# Patient Record
Sex: FEMALE | Race: WHITE
[De-identification: names, ages, dates, MRNs, and addresses within clinical notes are randomized per-mention and may not be internally consistent; named-entity substitution may affect disease eponyms.]

---

## 2022-10-30 ENCOUNTER — HOSPITAL ENCOUNTER (EMERGENCY)
Dept: HOSPITAL 26 - MED | Age: 57
Discharge: HOME | End: 2022-10-30
Payer: COMMERCIAL

## 2022-10-30 VITALS — HEIGHT: 62 IN | BODY MASS INDEX: 25.76 KG/M2 | WEIGHT: 140 LBS

## 2022-10-30 VITALS — SYSTOLIC BLOOD PRESSURE: 145 MMHG | DIASTOLIC BLOOD PRESSURE: 75 MMHG

## 2022-10-30 VITALS — SYSTOLIC BLOOD PRESSURE: 169 MMHG | DIASTOLIC BLOOD PRESSURE: 90 MMHG

## 2022-10-30 DIAGNOSIS — F17.200: ICD-10-CM

## 2022-10-30 DIAGNOSIS — J45.901: Primary | ICD-10-CM

## 2022-10-30 PROCEDURE — 99283 EMERGENCY DEPT VISIT LOW MDM: CPT

## 2022-10-30 PROCEDURE — 94760 N-INVAS EAR/PLS OXIMETRY 1: CPT

## 2022-10-30 PROCEDURE — 94640 AIRWAY INHALATION TREATMENT: CPT

## 2022-10-30 NOTE — NUR
Pt brought to bed 12 with SOB and active anxiety attack. RT called to bedside 
and pt placed on monitor at this time. Pt AOX4, GCS15, speaking in full 
sentences. Hx of asthma.

## 2022-10-30 NOTE — NUR
Patient discharged with v/s stable. Written and verbal after care instructions 
given and explained with teachback. 

Patient alert, oriented and verbalized understanding of instructions. 
Ambulatory with steady gait. All questions addressed prior to discharge. ID 
band removed. Patient advised to follow up with PMD. Rx of prednisone/albuterol 
given. Patient educated on indication of medication including possible reaction 
and side effects. Opportunity to ask questions provided and answered.

## 2022-10-30 NOTE — NUR
55 y/o female bib  for anxiety attack and asthma exacerbation. RT called 
to bedside and assessed. Pt able to soeak in full sentences and remains AOX4, 
able to make needs known and GCS15. Pt endorses severe anxiety and has not been 
taking her asthma medications 



PmHx: asthma/Anxiety

## 2023-02-04 ENCOUNTER — HOSPITAL ENCOUNTER (EMERGENCY)
Dept: HOSPITAL 26 - MED | Age: 58
Discharge: HOME | End: 2023-02-04
Payer: COMMERCIAL

## 2023-02-04 VITALS — DIASTOLIC BLOOD PRESSURE: 57 MMHG | SYSTOLIC BLOOD PRESSURE: 130 MMHG

## 2023-02-04 VITALS — BODY MASS INDEX: 28 KG/M2 | HEIGHT: 64 IN | WEIGHT: 164 LBS

## 2023-02-04 VITALS — DIASTOLIC BLOOD PRESSURE: 87 MMHG | SYSTOLIC BLOOD PRESSURE: 136 MMHG

## 2023-02-04 DIAGNOSIS — J45.901: Primary | ICD-10-CM

## 2023-02-04 DIAGNOSIS — Z88.8: ICD-10-CM

## 2023-02-04 DIAGNOSIS — J44.9: ICD-10-CM

## 2023-02-04 PROCEDURE — 94640 AIRWAY INHALATION TREATMENT: CPT

## 2023-02-04 PROCEDURE — 99283 EMERGENCY DEPT VISIT LOW MDM: CPT

## 2023-02-04 PROCEDURE — 71045 X-RAY EXAM CHEST 1 VIEW: CPT

## 2023-02-04 NOTE — NUR
dPatient discharged with v/s stable. Written and verbal after care instructions 
given and explained. 

Patient verbalized understanding. Ambulatory with steady gait. All questions 
addressed prior to discharge. Advised to follow up with PMD.

## 2023-03-09 ENCOUNTER — HOSPITAL ENCOUNTER (INPATIENT)
Dept: HOSPITAL 26 - MED | Age: 58
LOS: 16 days | DRG: 720 | End: 2023-03-25
Attending: STUDENT IN AN ORGANIZED HEALTH CARE EDUCATION/TRAINING PROGRAM | Admitting: STUDENT IN AN ORGANIZED HEALTH CARE EDUCATION/TRAINING PROGRAM
Payer: COMMERCIAL

## 2023-03-09 VITALS — SYSTOLIC BLOOD PRESSURE: 110 MMHG | DIASTOLIC BLOOD PRESSURE: 74 MMHG

## 2023-03-09 VITALS — SYSTOLIC BLOOD PRESSURE: 148 MMHG | DIASTOLIC BLOOD PRESSURE: 65 MMHG

## 2023-03-09 VITALS — DIASTOLIC BLOOD PRESSURE: 54 MMHG | SYSTOLIC BLOOD PRESSURE: 97 MMHG

## 2023-03-09 VITALS — DIASTOLIC BLOOD PRESSURE: 81 MMHG | SYSTOLIC BLOOD PRESSURE: 118 MMHG

## 2023-03-09 VITALS
WEIGHT: 165 LBS | BODY MASS INDEX: 31.15 KG/M2 | DIASTOLIC BLOOD PRESSURE: 76 MMHG | SYSTOLIC BLOOD PRESSURE: 127 MMHG | HEIGHT: 61 IN

## 2023-03-09 VITALS — SYSTOLIC BLOOD PRESSURE: 115 MMHG | DIASTOLIC BLOOD PRESSURE: 67 MMHG

## 2023-03-09 VITALS — SYSTOLIC BLOOD PRESSURE: 158 MMHG | DIASTOLIC BLOOD PRESSURE: 50 MMHG

## 2023-03-09 VITALS — DIASTOLIC BLOOD PRESSURE: 74 MMHG | SYSTOLIC BLOOD PRESSURE: 109 MMHG

## 2023-03-09 VITALS — SYSTOLIC BLOOD PRESSURE: 148 MMHG | DIASTOLIC BLOOD PRESSURE: 83 MMHG

## 2023-03-09 VITALS — DIASTOLIC BLOOD PRESSURE: 49 MMHG | SYSTOLIC BLOOD PRESSURE: 91 MMHG

## 2023-03-09 VITALS — SYSTOLIC BLOOD PRESSURE: 69 MMHG | DIASTOLIC BLOOD PRESSURE: 45 MMHG

## 2023-03-09 DIAGNOSIS — E87.1: ICD-10-CM

## 2023-03-09 DIAGNOSIS — E83.42: ICD-10-CM

## 2023-03-09 DIAGNOSIS — E83.39: ICD-10-CM

## 2023-03-09 DIAGNOSIS — N17.0: ICD-10-CM

## 2023-03-09 DIAGNOSIS — Z88.8: ICD-10-CM

## 2023-03-09 DIAGNOSIS — J96.21: ICD-10-CM

## 2023-03-09 DIAGNOSIS — J69.0: ICD-10-CM

## 2023-03-09 DIAGNOSIS — E87.20: ICD-10-CM

## 2023-03-09 DIAGNOSIS — Z79.899: ICD-10-CM

## 2023-03-09 DIAGNOSIS — I46.9: ICD-10-CM

## 2023-03-09 DIAGNOSIS — J44.0: ICD-10-CM

## 2023-03-09 DIAGNOSIS — R57.9: ICD-10-CM

## 2023-03-09 DIAGNOSIS — Z20.822: ICD-10-CM

## 2023-03-09 DIAGNOSIS — K63.89: ICD-10-CM

## 2023-03-09 DIAGNOSIS — Z91.199: ICD-10-CM

## 2023-03-09 DIAGNOSIS — F17.200: ICD-10-CM

## 2023-03-09 DIAGNOSIS — J45.902: ICD-10-CM

## 2023-03-09 DIAGNOSIS — A41.9: Primary | ICD-10-CM

## 2023-03-09 DIAGNOSIS — J44.1: ICD-10-CM

## 2023-03-09 DIAGNOSIS — R65.21: ICD-10-CM

## 2023-03-09 DIAGNOSIS — D72.829: ICD-10-CM

## 2023-03-09 LAB
ALBUMIN FLD-MCNC: 3.8 G/DL (ref 3.4–5)
ANION GAP SERPL CALCULATED.3IONS-SCNC: 10.5 MMOL/L (ref 8–16)
AST SERPL-CCNC: 79 U/L (ref 15–37)
BASOPHILS # BLD AUTO: 0 K/UL (ref 0–0.22)
BASOPHILS NFR BLD AUTO: 0.1 % (ref 0–2)
BILIRUB SERPL-MCNC: 1 MG/DL (ref 0–1)
BUN SERPL-MCNC: 35 MG/DL (ref 7–18)
CHLORIDE SERPL-SCNC: 96 MMOL/L (ref 98–107)
CO2 SERPL-SCNC: 32.9 MMOL/L (ref 21–32)
CREAT SERPL-MCNC: 1 MG/DL (ref 0.6–1.3)
EOSINOPHIL # BLD AUTO: 0 K/UL (ref 0–0.4)
EOSINOPHIL NFR BLD AUTO: 0 % (ref 0–4)
ERYTHROCYTE [DISTWIDTH] IN BLOOD BY AUTOMATED COUNT: 14.7 % (ref 11.6–13.7)
GFR SERPL CREATININE-BSD FRML MDRD: 73 ML/MIN (ref 90–?)
GLUCOSE SERPL-MCNC: 174 MG/DL (ref 74–106)
HCT VFR BLD AUTO: 43.4 % (ref 36–48)
HGB BLD-MCNC: 14.7 G/DL (ref 12–16)
LYMPHOCYTES # BLD AUTO: 0.3 K/UL (ref 2.5–16.5)
LYMPHOCYTES NFR BLD AUTO: 2.8 % (ref 20.5–51.1)
MCH RBC QN AUTO: 34 PG (ref 27–31)
MCHC RBC AUTO-ENTMCNC: 34 G/DL (ref 33–37)
MCV RBC AUTO: 99.3 FL (ref 80–94)
MONOCYTES # BLD AUTO: 0.9 K/UL (ref 0.8–1)
MONOCYTES NFR BLD AUTO: 7.7 % (ref 1.7–9.3)
NEUTROPHILS # BLD AUTO: 10.4 K/UL (ref 1.8–7.7)
NEUTROPHILS NFR BLD AUTO: 89.4 % (ref 42.2–75.2)
PLATELET # BLD AUTO: 205 K/UL (ref 140–450)
POTASSIUM SERPL-SCNC: 4.4 MMOL/L (ref 3.5–5.1)
RBC # BLD AUTO: 4.37 MIL/UL (ref 4.2–5.4)
SODIUM SERPL-SCNC: 135 MMOL/L (ref 136–145)
WBC # BLD AUTO: 11.7 K/UL (ref 4.8–10.8)

## 2023-03-09 PROCEDURE — 5A1955Z RESPIRATORY VENTILATION, GREATER THAN 96 CONSECUTIVE HOURS: ICD-10-PCS | Performed by: EMERGENCY MEDICINE

## 2023-03-09 PROCEDURE — 0BH17EZ INSERTION OF ENDOTRACHEAL AIRWAY INTO TRACHEA, VIA NATURAL OR ARTIFICIAL OPENING: ICD-10-PCS | Performed by: EMERGENCY MEDICINE

## 2023-03-09 PROCEDURE — 02HV33Z INSERTION OF INFUSION DEVICE INTO SUPERIOR VENA CAVA, PERCUTANEOUS APPROACH: ICD-10-PCS | Performed by: EMERGENCY MEDICINE

## 2023-03-09 RX ADMIN — WATER SCH MG: 1 INJECTION INTRAMUSCULAR; INTRAVENOUS; SUBCUTANEOUS at 20:24

## 2023-03-09 RX ADMIN — PROPOFOL PRN MLS/HR: 10 INJECTION, EMULSION INTRAVENOUS at 20:35

## 2023-03-09 RX ADMIN — ALBUTEROL SULFATE SCH MG: 2.5 SOLUTION RESPIRATORY (INHALATION) at 08:14

## 2023-03-09 RX ADMIN — ACETAMINOPHEN PRN MG: 325 TABLET ORAL at 08:39

## 2023-03-09 RX ADMIN — ALBUTEROL SULFATE SCH MG: 2.5 SOLUTION RESPIRATORY (INHALATION) at 15:00

## 2023-03-09 RX ADMIN — WATER SCH MG: 1 INJECTION INTRAMUSCULAR; INTRAVENOUS; SUBCUTANEOUS at 13:06

## 2023-03-09 RX ADMIN — ALBUTEROL SULFATE SCH MG: 2.5 SOLUTION RESPIRATORY (INHALATION) at 19:38

## 2023-03-09 RX ADMIN — ALBUTEROL SULFATE SCH MG: 2.5 SOLUTION RESPIRATORY (INHALATION) at 14:05

## 2023-03-09 RX ADMIN — ALBUTEROL SULFATE SCH MG: 2.5 SOLUTION RESPIRATORY (INHALATION) at 23:07

## 2023-03-09 NOTE — NUR
DR. TODD LLANES NOTIFIED OF STAT ABG RESULTS VORBO: BIPAP TO MASK; ADVISED 
FOREMENTIONED MD OF PATIENT CONFUSION AND REFUSAL TO WEAR BIPAP TO MASK [facility] CHARGE 
NURSE NOTIFIED

## 2023-03-09 NOTE — NUR
DR COOLEY AT BEDSIDE, PATIENT WAS SUCCESSFULLY INTUBATED AT 2037 WITH ETT SIZE 8.0 AND 
SECURED WITH BITTING BLOCK ANCHOR-FAST 24cm @GUM. BILATERAL BREATH SOUNDS ON AUSCULTATION. 
COLOR CHANGE IN COLORIMETRIC CO2. STAT X RAY ORDERED FOR OFFICIAL ETT PLACEMENT.

## 2023-03-09 NOTE — NUR
RECEIVED PT ON BED ON BIPAP, DROWSY TO LETHARGIC, WITH OOACIONAL EPISODES OF AGITATION AND 
RESTLESSNESS. ASSESSMENT DONE. SAFETY AND FALL PRECAUTION MAINTAINED. REPOSITIONED 
COMFORTABLY, SUPPORTED WITH PILLOWS. KEPT CLEAN AND DRY. SR. CONT TO RICHAR,

## 2023-03-09 NOTE — NUR
ABG DONE BY THE RT AND RESULT WAS SHOWN TO ER MD DR. RIBERA, DECIDED TO INTUBATE THE PATIENT; 
DR. LLANES WAS ALSO INFORMED OF ABG RESULT, ORDERED TO INTUBATE THE PATIENT.

## 2023-03-09 NOTE — NUR
PATIENT BECAME EXTREMELY ANXIOUS  SHORT OF BREATH WITH SOME DIFFICULTY BREATHING. MEDICATED 
WITH ATIVAN, RT AT BEDSIDE  ATTEMPTED TO PLACE BIPAP ON PATIENT BUT SHE REFUSED AND BECAME 
COMBATIVE. CHARGE NURSE  UPDATED MD ON PATIENT STATUS, ORDERS RECEIVED, PATIENT WILL BE 
TRANSFERRED TO ICU . PATIENT CURRENTLY PLACED ON HI FLOW AT 40LITERS, FIO2 50% TEMP 33. WILL 
CONTINUE TO MONITOR CLOSELY.

## 2023-03-09 NOTE — NUR
PATIENT HAS BEEN SCREENED AND CATEGORIZED AS MODERATE NUTRITION RISK. PATIENT WILL BE SEEN 
WITHIN 3-5 DAYS OF ADMISSION.



3/12/233/14/23



REVIEWED BY RINA BRADY RD

## 2023-03-09 NOTE — NUR
PATIENT REFUSING BIPAP TO MASK AT THIS TIME [facility] CHARGE RN NOTIFIED

## 2023-03-09 NOTE — NUR
PT STILL AGITATED, REMOVED BiPAP MASK OFF. PLACED BiPAP MASK BACK ON PT. PT TOLERATING WELL 
AT THIS TIME. RN AT BEDSIDE

## 2023-03-09 NOTE — NUR
Patient will be admitted to care of MD HANDY. Admited to TELE.  Will go to 
room 119A. Belongings list completed.  Report to WIL ACEVEDO .

## 2023-03-09 NOTE — NUR
REPORTED POST INTUBATION ABG RESULTS TO DR. LLANES. NO CHANGES AT THIS TIME. TITRATED FiO2. 
OBTAINED AN ORDER FOR AM ABG.

## 2023-03-09 NOTE — NUR
58 Y/O F presents with SOB and chest pressure. pt denies any pain at the 
moment. pt stated "i cant breathe" pt has a history of smoking. pt is A&Ox4, 
skin intact. pt stated she was seen at [facility] for the same thing 
xyesterday. 



PMH-

allergies-azithromycin

## 2023-03-09 NOTE — NUR
AT THE BEDSIDE AND ASSESSED THE PATIENT. PER DR. COOLEY NO NEED FOR PATIENT TO BE 
INTUBATED AND WILL ORDER BREATHING TREATMENT. WILL CONTINUE TO MONITOR.

## 2023-03-09 NOTE — NUR
RECEIVED REPORT FROM AM SHIFT. PATIENT WAS SEEN AND ASSESSED. PATIENT IS ON SPONTANEOUS TIME 
MODE ON BiPAP. PATIENT IS ON BiPAP SETTINGS: IPAP 18, EPAP 7, BACK UP RR 16, FiO2 40%. SPO2 
97%. NOTICED ADEQUATE BILATERAL CHEST RISE AND FALL. MACHINE PLUGGED IN RED OUTLET. BiPAP 
ALARMS SET APPROPRIATELY AND AUDIBLE TO ENVIRONMENT. AMBU BAG AT BEDSIDE. PATIENT IS IN NO 
RESPIRATORY DISTRESS AT THIS TIME. BILATERAL BREATH SOUNDS ON AUSCULTATION; 

UPPER LOBES: WHEEZING 

LOWER LOBES: WHEEZING

HHN TX GIVEN AS ORDERED AND PATIENT TOLERATED TX WELL WITH NO ADVERSE REACTION.

WILL CONTINUE TO MONITOR PATIENT.

## 2023-03-09 NOTE — NUR
PT NOW INTUBATED, CXR DONE FOR PLACEMENT VERIFICATION. CENTRAL LINE INSERTED BY SAME MD TO 
LEFT SUBCLAVIAN 3 LUMEN. STARTED ON FENTANYL AND VECURONIUM DRIPS PER PROTOCOL.  CASS SOFT 
WRIST RESTRAINTS IN PLACE. POST ABG DONE, RESULT REPORTED TO MD, FIO2 CHANGED TO 40%. CHARGE 
NURSE CALLED AND UPDATED PT'S SPOUSE ADILSON. SAFETY MEASURES OBSERVED. CONT TO MONITOR.

## 2023-03-09 NOTE — NUR
CRITICAL ABG RESULTS WERE REPORTED TO DR. LLANES AND UPDATED ON PT CONDITION. INTUBATION 
ORDER OBTAINED. DR. COOLEY (ED DOCTOR) WAS NOTIFIED AND AGREED TO INTUBATE PT.

## 2023-03-09 NOTE — NUR
PLACED ON A VAPOTHERM AT THIS TIME; SEDATION GIVEN BY RN DUE COMBATIVENESS; EXTERNAL AND 
INTERNAL SUCTION FOR COPIOUS FROTH YELLOW SECRETIONS

## 2023-03-10 VITALS — DIASTOLIC BLOOD PRESSURE: 68 MMHG | SYSTOLIC BLOOD PRESSURE: 107 MMHG

## 2023-03-10 VITALS — DIASTOLIC BLOOD PRESSURE: 53 MMHG | SYSTOLIC BLOOD PRESSURE: 90 MMHG

## 2023-03-10 VITALS — SYSTOLIC BLOOD PRESSURE: 111 MMHG | DIASTOLIC BLOOD PRESSURE: 73 MMHG

## 2023-03-10 VITALS — SYSTOLIC BLOOD PRESSURE: 101 MMHG | DIASTOLIC BLOOD PRESSURE: 68 MMHG

## 2023-03-10 VITALS — DIASTOLIC BLOOD PRESSURE: 65 MMHG | SYSTOLIC BLOOD PRESSURE: 105 MMHG

## 2023-03-10 VITALS — DIASTOLIC BLOOD PRESSURE: 66 MMHG | SYSTOLIC BLOOD PRESSURE: 102 MMHG

## 2023-03-10 VITALS — SYSTOLIC BLOOD PRESSURE: 99 MMHG | DIASTOLIC BLOOD PRESSURE: 63 MMHG

## 2023-03-10 VITALS — DIASTOLIC BLOOD PRESSURE: 75 MMHG | SYSTOLIC BLOOD PRESSURE: 116 MMHG

## 2023-03-10 VITALS — DIASTOLIC BLOOD PRESSURE: 61 MMHG | SYSTOLIC BLOOD PRESSURE: 104 MMHG

## 2023-03-10 VITALS — SYSTOLIC BLOOD PRESSURE: 116 MMHG | DIASTOLIC BLOOD PRESSURE: 77 MMHG

## 2023-03-10 VITALS — SYSTOLIC BLOOD PRESSURE: 124 MMHG | DIASTOLIC BLOOD PRESSURE: 71 MMHG

## 2023-03-10 VITALS — DIASTOLIC BLOOD PRESSURE: 67 MMHG | SYSTOLIC BLOOD PRESSURE: 97 MMHG

## 2023-03-10 VITALS — SYSTOLIC BLOOD PRESSURE: 98 MMHG | DIASTOLIC BLOOD PRESSURE: 66 MMHG

## 2023-03-10 VITALS — SYSTOLIC BLOOD PRESSURE: 108 MMHG | DIASTOLIC BLOOD PRESSURE: 56 MMHG

## 2023-03-10 VITALS — DIASTOLIC BLOOD PRESSURE: 68 MMHG | SYSTOLIC BLOOD PRESSURE: 103 MMHG

## 2023-03-10 VITALS — DIASTOLIC BLOOD PRESSURE: 83 MMHG | SYSTOLIC BLOOD PRESSURE: 176 MMHG

## 2023-03-10 VITALS — DIASTOLIC BLOOD PRESSURE: 60 MMHG | SYSTOLIC BLOOD PRESSURE: 105 MMHG

## 2023-03-10 VITALS — SYSTOLIC BLOOD PRESSURE: 107 MMHG | DIASTOLIC BLOOD PRESSURE: 60 MMHG

## 2023-03-10 VITALS — DIASTOLIC BLOOD PRESSURE: 69 MMHG | SYSTOLIC BLOOD PRESSURE: 102 MMHG

## 2023-03-10 VITALS — DIASTOLIC BLOOD PRESSURE: 67 MMHG | SYSTOLIC BLOOD PRESSURE: 96 MMHG

## 2023-03-10 VITALS — DIASTOLIC BLOOD PRESSURE: 58 MMHG | SYSTOLIC BLOOD PRESSURE: 98 MMHG

## 2023-03-10 VITALS — DIASTOLIC BLOOD PRESSURE: 83 MMHG | SYSTOLIC BLOOD PRESSURE: 131 MMHG

## 2023-03-10 VITALS — DIASTOLIC BLOOD PRESSURE: 70 MMHG | SYSTOLIC BLOOD PRESSURE: 114 MMHG

## 2023-03-10 VITALS — DIASTOLIC BLOOD PRESSURE: 71 MMHG | SYSTOLIC BLOOD PRESSURE: 103 MMHG

## 2023-03-10 VITALS — DIASTOLIC BLOOD PRESSURE: 73 MMHG | SYSTOLIC BLOOD PRESSURE: 109 MMHG

## 2023-03-10 VITALS — SYSTOLIC BLOOD PRESSURE: 95 MMHG | DIASTOLIC BLOOD PRESSURE: 61 MMHG

## 2023-03-10 LAB
ANION GAP SERPL CALCULATED.3IONS-SCNC: 4.8 MMOL/L (ref 8–16)
BASOPHILS # BLD AUTO: 0 K/UL (ref 0–0.22)
BASOPHILS NFR BLD AUTO: 0.5 % (ref 0–2)
BUN SERPL-MCNC: 31 MG/DL (ref 7–18)
CHLORIDE SERPL-SCNC: 101 MMOL/L (ref 98–107)
CO2 SERPL-SCNC: 38.4 MMOL/L (ref 21–32)
CREAT SERPL-MCNC: 0.7 MG/DL (ref 0.6–1.3)
EOSINOPHIL # BLD AUTO: 0 K/UL (ref 0–0.4)
EOSINOPHIL NFR BLD AUTO: 0 % (ref 0–4)
ERYTHROCYTE [DISTWIDTH] IN BLOOD BY AUTOMATED COUNT: 14.3 % (ref 11.6–13.7)
GFR SERPL CREATININE-BSD FRML MDRD: 111 ML/MIN (ref 90–?)
GLUCOSE SERPL-MCNC: 184 MG/DL (ref 74–106)
HCT VFR BLD AUTO: 37.9 % (ref 36–48)
HGB BLD-MCNC: 13.1 G/DL (ref 12–16)
LYMPHOCYTES # BLD AUTO: 0.2 K/UL (ref 2.5–16.5)
LYMPHOCYTES NFR BLD AUTO: 2.3 % (ref 20.5–51.1)
MCH RBC QN AUTO: 34 PG (ref 27–31)
MCHC RBC AUTO-ENTMCNC: 35 G/DL (ref 33–37)
MCV RBC AUTO: 97.3 FL (ref 80–94)
MONOCYTES # BLD AUTO: 0.4 K/UL (ref 0.8–1)
MONOCYTES NFR BLD AUTO: 5.6 % (ref 1.7–9.3)
NEUTROPHILS # BLD AUTO: 6.7 K/UL (ref 1.8–7.7)
NEUTROPHILS NFR BLD AUTO: 91.6 % (ref 42.2–75.2)
PLATELET # BLD AUTO: 169 K/UL (ref 140–450)
POTASSIUM SERPL-SCNC: 4.2 MMOL/L (ref 3.5–5.1)
RBC # BLD AUTO: 3.9 MIL/UL (ref 4.2–5.4)
SODIUM SERPL-SCNC: 140 MMOL/L (ref 136–145)
WBC # BLD AUTO: 7.3 K/UL (ref 4.8–10.8)

## 2023-03-10 RX ADMIN — INSULIN HUMAN PRN MLS/HR: 100 INJECTION, SOLUTION PARENTERAL at 23:49

## 2023-03-10 RX ADMIN — PROPOFOL PRN MLS/HR: 10 INJECTION, EMULSION INTRAVENOUS at 21:36

## 2023-03-10 RX ADMIN — PROPOFOL PRN MLS/HR: 10 INJECTION, EMULSION INTRAVENOUS at 12:34

## 2023-03-10 RX ADMIN — PROPOFOL PRN MLS/HR: 10 INJECTION, EMULSION INTRAVENOUS at 08:35

## 2023-03-10 RX ADMIN — ALBUTEROL SULFATE SCH MG: 2.5 SOLUTION RESPIRATORY (INHALATION) at 10:37

## 2023-03-10 RX ADMIN — WATER SCH MG: 1 INJECTION INTRAMUSCULAR; INTRAVENOUS; SUBCUTANEOUS at 17:04

## 2023-03-10 RX ADMIN — WATER SCH MG: 1 INJECTION INTRAMUSCULAR; INTRAVENOUS; SUBCUTANEOUS at 23:55

## 2023-03-10 RX ADMIN — IPRATROPIUM BROMIDE SCH MG: 0.5 SOLUTION RESPIRATORY (INHALATION) at 10:37

## 2023-03-10 RX ADMIN — PROPOFOL PRN MLS/HR: 10 INJECTION, EMULSION INTRAVENOUS at 03:02

## 2023-03-10 RX ADMIN — ALBUTEROL SULFATE SCH MG: 2.5 SOLUTION RESPIRATORY (INHALATION) at 22:45

## 2023-03-10 RX ADMIN — IPRATROPIUM BROMIDE SCH MG: 0.5 SOLUTION RESPIRATORY (INHALATION) at 19:28

## 2023-03-10 RX ADMIN — ALBUTEROL SULFATE SCH MG: 2.5 SOLUTION RESPIRATORY (INHALATION) at 15:36

## 2023-03-10 RX ADMIN — ALBUTEROL SULFATE SCH MG: 2.5 SOLUTION RESPIRATORY (INHALATION) at 08:05

## 2023-03-10 RX ADMIN — IPRATROPIUM BROMIDE SCH MG: 0.5 SOLUTION RESPIRATORY (INHALATION) at 08:05

## 2023-03-10 RX ADMIN — WATER SCH MG: 1 INJECTION INTRAMUSCULAR; INTRAVENOUS; SUBCUTANEOUS at 05:45

## 2023-03-10 RX ADMIN — WATER SCH MG: 1 INJECTION INTRAMUSCULAR; INTRAVENOUS; SUBCUTANEOUS at 12:05

## 2023-03-10 RX ADMIN — IPRATROPIUM BROMIDE SCH MG: 0.5 SOLUTION RESPIRATORY (INHALATION) at 15:36

## 2023-03-10 RX ADMIN — ALBUTEROL SULFATE SCH MG: 2.5 SOLUTION RESPIRATORY (INHALATION) at 19:28

## 2023-03-10 RX ADMIN — IPRATROPIUM BROMIDE SCH MG: 0.5 SOLUTION RESPIRATORY (INHALATION) at 22:45

## 2023-03-10 RX ADMIN — ALBUTEROL SULFATE SCH MG: 2.5 SOLUTION RESPIRATORY (INHALATION) at 03:52

## 2023-03-10 RX ADMIN — PROPOFOL PRN MLS/HR: 10 INJECTION, EMULSION INTRAVENOUS at 17:11

## 2023-03-10 NOTE — NUR
ASSUMED CARE OF THIS PATIENT AND ASSESSMENT DONE AND COMPLETED.SEDATED ON PROPOFOL 50MCG AND 
FENTANYL 1MCG.ASYMPTOMATIC OF ANY PAIN AND DISCOMFORT AT THIS TIME.SR/ST ON THE 
MONITOR.AFEBRILE.ON VENTILATOR WITH SETTINGS ACPC  20 45% PEEP 5.LUNG SOUNDS WITH RHONCHI 
THROUGHOUT.ABDOMEN OBESE WITH +BS X4 QUADS.ALL PULSES PRESENT AND PALPLABLE LEFT SUBCLAVIAN 
PATENT AND INTACT AND BENIGN.JAIN IN PLACE AND DRAINING ADEQUATE AMOUNT OF URINE.WILL 
CONTINUE [place] PROCEED WITH CARE.

## 2023-03-10 NOTE — NUR
DC PLANNING 



*** ASSESSMENT COMPLETE***



PLEASE REFER TO ASSESSMENT FOR ADDITIONAL DETAILS



PT IS A 57 YR OLD FEMALE ADMITTED TO [facility] FROM HOME WITH DX OF ASTHMA 

EXACERBATION. PT HAS PAST MEDICAL HX OF ASTHMA, COPD, AND SMOKING.



PT IS REPORTED TO UTILIZE WC AS NEEDED, HOWEVER REPORTS PT IS TYPICALLY 

AMBULATORY. ADILSON REPORTS PT RECENTLY BEGAN REQUIRING ASSISTANCE WITH ADL'S 

THAT  AND SON AID WITH.



PT IS REPORTED TO MEET WITH PSYCHIATRIST 1X MONTHLY FOR MANAGEMENT OF ANXIETY SX'S.



ADILSON REPORTS TENTATIVE DC PLAN IS FOR PT TO RETURN HOME WITH FAMILY 

PROVIDING TRANSPORTATION, WHEN MEDICALLY STABLE.


-------------------------------------------------------------------------------

Addendum: 03/10/23 at 1618 by Miranda LEACH

-------------------------------------------------------------------------------

Amended: Links added.

## 2023-03-10 NOTE — NUR
REPORT RECEIVED. PATIENT WAS AGITATED AS EVIDENCED BY , 137 AND EYES WIDE OPEN AND 
TREMBLING. BUT I TITRATED THE PROPOFOL UP AND THE FENTANYL AND THE PATIENT BECAME CALM AND 
BP WENT DOWN /80 AND CLOSED EYES COMFORTABLY. WILL HOLD VEC DRIP UNIL DR DE LA ROSA COMES 
BY TO ASSESS. I INFORMED HIM VIA TEXT THAT THE PATIENT MIGHT NOT NEED PARALYTICS ANYMORE. 
WILL RESTART PROTOCOL IF HE FINDS IN NECESSARY

## 2023-03-10 NOTE — NUR
PER DR. LLANES, PT RATE ON VENT WAS TITRATED DOWN TO 20. PATIENT IS NO LONGER BREATH STACK 
AND IN SYNCH WITH VENTILATOR

## 2023-03-11 VITALS — DIASTOLIC BLOOD PRESSURE: 74 MMHG | SYSTOLIC BLOOD PRESSURE: 120 MMHG

## 2023-03-11 VITALS — SYSTOLIC BLOOD PRESSURE: 155 MMHG | DIASTOLIC BLOOD PRESSURE: 77 MMHG

## 2023-03-11 VITALS — SYSTOLIC BLOOD PRESSURE: 118 MMHG | DIASTOLIC BLOOD PRESSURE: 75 MMHG

## 2023-03-11 VITALS — DIASTOLIC BLOOD PRESSURE: 93 MMHG | SYSTOLIC BLOOD PRESSURE: 160 MMHG

## 2023-03-11 VITALS — SYSTOLIC BLOOD PRESSURE: 131 MMHG | DIASTOLIC BLOOD PRESSURE: 79 MMHG

## 2023-03-11 VITALS — DIASTOLIC BLOOD PRESSURE: 70 MMHG | SYSTOLIC BLOOD PRESSURE: 105 MMHG

## 2023-03-11 VITALS — SYSTOLIC BLOOD PRESSURE: 114 MMHG | DIASTOLIC BLOOD PRESSURE: 68 MMHG

## 2023-03-11 VITALS — SYSTOLIC BLOOD PRESSURE: 116 MMHG | DIASTOLIC BLOOD PRESSURE: 73 MMHG

## 2023-03-11 VITALS — DIASTOLIC BLOOD PRESSURE: 73 MMHG | SYSTOLIC BLOOD PRESSURE: 110 MMHG

## 2023-03-11 VITALS — SYSTOLIC BLOOD PRESSURE: 107 MMHG | DIASTOLIC BLOOD PRESSURE: 62 MMHG

## 2023-03-11 VITALS — SYSTOLIC BLOOD PRESSURE: 100 MMHG | DIASTOLIC BLOOD PRESSURE: 64 MMHG

## 2023-03-11 VITALS — SYSTOLIC BLOOD PRESSURE: 146 MMHG | DIASTOLIC BLOOD PRESSURE: 98 MMHG

## 2023-03-11 VITALS — DIASTOLIC BLOOD PRESSURE: 78 MMHG | SYSTOLIC BLOOD PRESSURE: 128 MMHG

## 2023-03-11 VITALS — DIASTOLIC BLOOD PRESSURE: 1 MMHG | SYSTOLIC BLOOD PRESSURE: 114 MMHG

## 2023-03-11 VITALS — SYSTOLIC BLOOD PRESSURE: 141 MMHG | DIASTOLIC BLOOD PRESSURE: 84 MMHG

## 2023-03-11 VITALS — SYSTOLIC BLOOD PRESSURE: 92 MMHG | DIASTOLIC BLOOD PRESSURE: 63 MMHG

## 2023-03-11 VITALS — SYSTOLIC BLOOD PRESSURE: 99 MMHG | DIASTOLIC BLOOD PRESSURE: 64 MMHG

## 2023-03-11 VITALS — DIASTOLIC BLOOD PRESSURE: 67 MMHG | SYSTOLIC BLOOD PRESSURE: 114 MMHG

## 2023-03-11 VITALS — SYSTOLIC BLOOD PRESSURE: 101 MMHG | DIASTOLIC BLOOD PRESSURE: 63 MMHG

## 2023-03-11 VITALS — SYSTOLIC BLOOD PRESSURE: 124 MMHG | DIASTOLIC BLOOD PRESSURE: 78 MMHG

## 2023-03-11 VITALS — SYSTOLIC BLOOD PRESSURE: 115 MMHG | DIASTOLIC BLOOD PRESSURE: 74 MMHG

## 2023-03-11 VITALS — DIASTOLIC BLOOD PRESSURE: 77 MMHG | SYSTOLIC BLOOD PRESSURE: 122 MMHG

## 2023-03-11 VITALS — SYSTOLIC BLOOD PRESSURE: 92 MMHG | DIASTOLIC BLOOD PRESSURE: 64 MMHG

## 2023-03-11 VITALS — DIASTOLIC BLOOD PRESSURE: 70 MMHG | SYSTOLIC BLOOD PRESSURE: 107 MMHG

## 2023-03-11 VITALS — DIASTOLIC BLOOD PRESSURE: 69 MMHG | SYSTOLIC BLOOD PRESSURE: 102 MMHG

## 2023-03-11 VITALS — SYSTOLIC BLOOD PRESSURE: 118 MMHG | DIASTOLIC BLOOD PRESSURE: 97 MMHG

## 2023-03-11 LAB
ANION GAP SERPL CALCULATED.3IONS-SCNC: 8.5 MMOL/L (ref 8–16)
BUN SERPL-MCNC: 28 MG/DL (ref 7–18)
CHLORIDE SERPL-SCNC: 102 MMOL/L (ref 98–107)
CO2 SERPL-SCNC: 36 MMOL/L (ref 21–32)
CREAT SERPL-MCNC: 0.7 MG/DL (ref 0.6–1.3)
ERYTHROCYTE [DISTWIDTH] IN BLOOD BY AUTOMATED COUNT: 14.9 % (ref 11.6–13.7)
GFR SERPL CREATININE-BSD FRML MDRD: 111 ML/MIN (ref 90–?)
GLUCOSE SERPL-MCNC: 191 MG/DL (ref 74–106)
HCT VFR BLD AUTO: 38.9 % (ref 36–48)
HGB BLD-MCNC: 13 G/DL (ref 12–16)
LYMPHOCYTES NFR BLD MANUAL: 3 % (ref 20–46)
MCH RBC QN AUTO: 34 PG (ref 27–31)
MCHC RBC AUTO-ENTMCNC: 34 G/DL (ref 33–37)
MCV RBC AUTO: 100.4 FL (ref 80–94)
MONOCYTES NFR BLD MANUAL: 9 % (ref 5–12)
PLATELET # BLD AUTO: 210 K/UL (ref 140–450)
POTASSIUM SERPL-SCNC: 4.5 MMOL/L (ref 3.5–5.1)
RBC # BLD AUTO: 3.88 MIL/UL (ref 4.2–5.4)
SODIUM SERPL-SCNC: 142 MMOL/L (ref 136–145)
WBC # BLD AUTO: 9.8 K/UL (ref 4.8–10.8)

## 2023-03-11 RX ADMIN — IPRATROPIUM BROMIDE SCH MG: 0.5 SOLUTION RESPIRATORY (INHALATION) at 23:02

## 2023-03-11 RX ADMIN — PROPOFOL PRN MLS/HR: 10 INJECTION, EMULSION INTRAVENOUS at 14:10

## 2023-03-11 RX ADMIN — IPRATROPIUM BROMIDE SCH MG: 0.5 SOLUTION RESPIRATORY (INHALATION) at 07:54

## 2023-03-11 RX ADMIN — PROPOFOL PRN MLS/HR: 10 INJECTION, EMULSION INTRAVENOUS at 10:35

## 2023-03-11 RX ADMIN — ALBUTEROL SULFATE SCH MG: 2.5 SOLUTION RESPIRATORY (INHALATION) at 10:15

## 2023-03-11 RX ADMIN — INSULIN HUMAN PRN MLS/HR: 100 INJECTION, SOLUTION PARENTERAL at 14:56

## 2023-03-11 RX ADMIN — ALBUTEROL SULFATE SCH MG: 2.5 SOLUTION RESPIRATORY (INHALATION) at 20:09

## 2023-03-11 RX ADMIN — IPRATROPIUM BROMIDE SCH MG: 0.5 SOLUTION RESPIRATORY (INHALATION) at 20:09

## 2023-03-11 RX ADMIN — PROPOFOL PRN MLS/HR: 10 INJECTION, EMULSION INTRAVENOUS at 01:19

## 2023-03-11 RX ADMIN — ALBUTEROL SULFATE SCH MG: 2.5 SOLUTION RESPIRATORY (INHALATION) at 03:07

## 2023-03-11 RX ADMIN — WATER SCH MG: 1 INJECTION INTRAMUSCULAR; INTRAVENOUS; SUBCUTANEOUS at 17:24

## 2023-03-11 RX ADMIN — IPRATROPIUM BROMIDE SCH MG: 0.5 SOLUTION RESPIRATORY (INHALATION) at 03:07

## 2023-03-11 RX ADMIN — PROPOFOL PRN MLS/HR: 10 INJECTION, EMULSION INTRAVENOUS at 06:01

## 2023-03-11 RX ADMIN — ALBUTEROL SULFATE SCH MG: 2.5 SOLUTION RESPIRATORY (INHALATION) at 15:38

## 2023-03-11 RX ADMIN — ALBUTEROL SULFATE SCH MG: 2.5 SOLUTION RESPIRATORY (INHALATION) at 23:02

## 2023-03-11 RX ADMIN — IPRATROPIUM BROMIDE SCH MG: 0.5 SOLUTION RESPIRATORY (INHALATION) at 10:14

## 2023-03-11 RX ADMIN — WATER SCH MG: 1 INJECTION INTRAMUSCULAR; INTRAVENOUS; SUBCUTANEOUS at 11:45

## 2023-03-11 RX ADMIN — PROPOFOL PRN MLS/HR: 10 INJECTION, EMULSION INTRAVENOUS at 18:15

## 2023-03-11 RX ADMIN — WATER SCH MG: 1 INJECTION INTRAMUSCULAR; INTRAVENOUS; SUBCUTANEOUS at 05:15

## 2023-03-11 RX ADMIN — IPRATROPIUM BROMIDE SCH MG: 0.5 SOLUTION RESPIRATORY (INHALATION) at 15:38

## 2023-03-11 RX ADMIN — ENOXAPARIN SODIUM SCH MG: 40 INJECTION SUBCUTANEOUS at 08:13

## 2023-03-11 RX ADMIN — PROPOFOL PRN MLS/HR: 10 INJECTION, EMULSION INTRAVENOUS at 22:15

## 2023-03-11 RX ADMIN — ALBUTEROL SULFATE SCH MG: 2.5 SOLUTION RESPIRATORY (INHALATION) at 07:54

## 2023-03-11 NOTE — NUR
SCREEN FOR LOW OSMAR SCALE AT RISK, CONTINUE TO FOLLOW PRESSURE ULCER PREVENTION 
INTERVENTIONS.

-TURN AND REPOSITION PATIENT Q 2H, ASSIST IF NEEDED 

-ASSESS AND MONITOR SKIN CONDITION DURING POSITION CHANGES

-OFFLOAD BILATERAL HEELS BY PLACING PILLOWS UNDER CALVES AT ALL TIMES, UNLESS OTHERWISE 
CONTRAINDICATED

-PRESSURE REDISTRIBUTION BY PLACING PILLOWS AND OFFLOADING SACRALCOCCYX

-KEEP SKIN CLEAN AND DRY AT ALL TIMES.

## 2023-03-11 NOTE — NUR
Received pt sedated. ETT to vent settings AC PC FiO2@45%, rate 20 PEEP 5. Sinus tachy on the 
monitor. OG-tube intact and clamped. Randle catheter intact and draining to gravity. Central 
line on left subclavian intact and infusing Propofol@50mcg/kg/min, Fentanyl@1mcg/kg/hr, and 
levophed@2mcg/min. Peripheral IV on right forearm 22 gauge intact and saline flushed. Bilat 
soft wrist restraints in place with no signs of injury. Safety precautions in place.

## 2023-03-12 VITALS — SYSTOLIC BLOOD PRESSURE: 148 MMHG | DIASTOLIC BLOOD PRESSURE: 87 MMHG

## 2023-03-12 VITALS — SYSTOLIC BLOOD PRESSURE: 101 MMHG | DIASTOLIC BLOOD PRESSURE: 65 MMHG

## 2023-03-12 VITALS — DIASTOLIC BLOOD PRESSURE: 93 MMHG | SYSTOLIC BLOOD PRESSURE: 139 MMHG

## 2023-03-12 VITALS — SYSTOLIC BLOOD PRESSURE: 98 MMHG | DIASTOLIC BLOOD PRESSURE: 69 MMHG

## 2023-03-12 VITALS — DIASTOLIC BLOOD PRESSURE: 77 MMHG | SYSTOLIC BLOOD PRESSURE: 139 MMHG

## 2023-03-12 VITALS — SYSTOLIC BLOOD PRESSURE: 132 MMHG | DIASTOLIC BLOOD PRESSURE: 83 MMHG

## 2023-03-12 VITALS — SYSTOLIC BLOOD PRESSURE: 143 MMHG | DIASTOLIC BLOOD PRESSURE: 88 MMHG

## 2023-03-12 VITALS — SYSTOLIC BLOOD PRESSURE: 141 MMHG | DIASTOLIC BLOOD PRESSURE: 78 MMHG

## 2023-03-12 VITALS — SYSTOLIC BLOOD PRESSURE: 96 MMHG | DIASTOLIC BLOOD PRESSURE: 69 MMHG

## 2023-03-12 VITALS — DIASTOLIC BLOOD PRESSURE: 69 MMHG | SYSTOLIC BLOOD PRESSURE: 101 MMHG

## 2023-03-12 VITALS — DIASTOLIC BLOOD PRESSURE: 66 MMHG | SYSTOLIC BLOOD PRESSURE: 95 MMHG

## 2023-03-12 VITALS — SYSTOLIC BLOOD PRESSURE: 152 MMHG | DIASTOLIC BLOOD PRESSURE: 91 MMHG

## 2023-03-12 VITALS — DIASTOLIC BLOOD PRESSURE: 91 MMHG | SYSTOLIC BLOOD PRESSURE: 138 MMHG

## 2023-03-12 VITALS — DIASTOLIC BLOOD PRESSURE: 69 MMHG | SYSTOLIC BLOOD PRESSURE: 100 MMHG

## 2023-03-12 VITALS — DIASTOLIC BLOOD PRESSURE: 68 MMHG | SYSTOLIC BLOOD PRESSURE: 96 MMHG

## 2023-03-12 VITALS — SYSTOLIC BLOOD PRESSURE: 102 MMHG | DIASTOLIC BLOOD PRESSURE: 68 MMHG

## 2023-03-12 VITALS — DIASTOLIC BLOOD PRESSURE: 66 MMHG | SYSTOLIC BLOOD PRESSURE: 98 MMHG

## 2023-03-12 VITALS — SYSTOLIC BLOOD PRESSURE: 139 MMHG | DIASTOLIC BLOOD PRESSURE: 87 MMHG

## 2023-03-12 VITALS — SYSTOLIC BLOOD PRESSURE: 101 MMHG | DIASTOLIC BLOOD PRESSURE: 67 MMHG

## 2023-03-12 LAB
ANION GAP SERPL CALCULATED.3IONS-SCNC: 7.5 MMOL/L (ref 8–16)
BASOPHILS NFR BLD MANUAL: 0 % (ref 0–2)
BUN SERPL-MCNC: 26 MG/DL (ref 7–18)
CHLORIDE SERPL-SCNC: 104 MMOL/L (ref 98–107)
CO2 SERPL-SCNC: 38.1 MMOL/L (ref 21–32)
CREAT SERPL-MCNC: 0.7 MG/DL (ref 0.6–1.3)
EOSINOPHIL NFR BLD MANUAL: 0 % (ref 0–4)
ERYTHROCYTE [DISTWIDTH] IN BLOOD BY AUTOMATED COUNT: 14.6 % (ref 11.6–13.7)
GFR SERPL CREATININE-BSD FRML MDRD: 111 ML/MIN (ref 90–?)
GLUCOSE SERPL-MCNC: 185 MG/DL (ref 74–106)
HCT VFR BLD AUTO: 39.2 % (ref 36–48)
HGB BLD-MCNC: 13.3 G/DL (ref 12–16)
LYMPHOCYTES NFR BLD MANUAL: 6 % (ref 20–46)
MCH RBC QN AUTO: 34 PG (ref 27–31)
MCHC RBC AUTO-ENTMCNC: 34 G/DL (ref 33–37)
MCV RBC AUTO: 100.5 FL (ref 80–94)
MONOCYTES NFR BLD MANUAL: 12 % (ref 5–12)
PLATELET # BLD AUTO: 173 K/UL (ref 140–450)
POTASSIUM SERPL-SCNC: 4.6 MMOL/L (ref 3.5–5.1)
RBC # BLD AUTO: 3.9 MIL/UL (ref 4.2–5.4)
SODIUM SERPL-SCNC: 145 MMOL/L (ref 136–145)
WBC # BLD AUTO: 7.3 K/UL (ref 4.8–10.8)

## 2023-03-12 RX ADMIN — PROPOFOL PRN MLS/HR: 10 INJECTION, EMULSION INTRAVENOUS at 03:12

## 2023-03-12 RX ADMIN — ALBUTEROL SULFATE SCH MG: 2.5 SOLUTION RESPIRATORY (INHALATION) at 14:27

## 2023-03-12 RX ADMIN — PROPOFOL PRN MLS/HR: 10 INJECTION, EMULSION INTRAVENOUS at 07:29

## 2023-03-12 RX ADMIN — NICARDIPINE HYDROCHLORIDE PRN MLS/HR: 25 INJECTION INTRAVENOUS at 09:56

## 2023-03-12 RX ADMIN — ALBUTEROL SULFATE SCH MG: 2.5 SOLUTION RESPIRATORY (INHALATION) at 11:02

## 2023-03-12 RX ADMIN — PROPOFOL PRN MLS/HR: 10 INJECTION, EMULSION INTRAVENOUS at 18:19

## 2023-03-12 RX ADMIN — WATER SCH MG: 1 INJECTION INTRAMUSCULAR; INTRAVENOUS; SUBCUTANEOUS at 11:43

## 2023-03-12 RX ADMIN — ALBUTEROL SULFATE SCH MG: 2.5 SOLUTION RESPIRATORY (INHALATION) at 03:54

## 2023-03-12 RX ADMIN — PROPOFOL PRN MLS/HR: 10 INJECTION, EMULSION INTRAVENOUS at 11:36

## 2023-03-12 RX ADMIN — ALBUTEROL SULFATE SCH MG: 2.5 SOLUTION RESPIRATORY (INHALATION) at 22:14

## 2023-03-12 RX ADMIN — INSULIN HUMAN PRN MLS/HR: 100 INJECTION, SOLUTION PARENTERAL at 00:37

## 2023-03-12 RX ADMIN — IPRATROPIUM BROMIDE SCH MG: 0.5 SOLUTION RESPIRATORY (INHALATION) at 11:02

## 2023-03-12 RX ADMIN — PROPOFOL PRN MLS/HR: 10 INJECTION, EMULSION INTRAVENOUS at 15:46

## 2023-03-12 RX ADMIN — IPRATROPIUM BROMIDE SCH MG: 0.5 SOLUTION RESPIRATORY (INHALATION) at 19:49

## 2023-03-12 RX ADMIN — ALBUTEROL SULFATE SCH MG: 2.5 SOLUTION RESPIRATORY (INHALATION) at 07:57

## 2023-03-12 RX ADMIN — WATER SCH MG: 1 INJECTION INTRAMUSCULAR; INTRAVENOUS; SUBCUTANEOUS at 17:55

## 2023-03-12 RX ADMIN — IPRATROPIUM BROMIDE SCH MG: 0.5 SOLUTION RESPIRATORY (INHALATION) at 03:53

## 2023-03-12 RX ADMIN — IPRATROPIUM BROMIDE SCH MG: 0.5 SOLUTION RESPIRATORY (INHALATION) at 22:14

## 2023-03-12 RX ADMIN — ENOXAPARIN SODIUM SCH MG: 40 INJECTION SUBCUTANEOUS at 08:39

## 2023-03-12 RX ADMIN — ALBUTEROL SULFATE SCH MG: 2.5 SOLUTION RESPIRATORY (INHALATION) at 19:49

## 2023-03-12 RX ADMIN — PROPOFOL PRN MLS/HR: 10 INJECTION, EMULSION INTRAVENOUS at 22:04

## 2023-03-12 RX ADMIN — IPRATROPIUM BROMIDE SCH MG: 0.5 SOLUTION RESPIRATORY (INHALATION) at 07:57

## 2023-03-12 RX ADMIN — WATER SCH MG: 1 INJECTION INTRAMUSCULAR; INTRAVENOUS; SUBCUTANEOUS at 05:40

## 2023-03-12 RX ADMIN — WATER SCH MG: 1 INJECTION INTRAMUSCULAR; INTRAVENOUS; SUBCUTANEOUS at 00:39

## 2023-03-12 RX ADMIN — IPRATROPIUM BROMIDE SCH MG: 0.5 SOLUTION RESPIRATORY (INHALATION) at 14:27

## 2023-03-12 NOTE — NUR
3/12/23 RD INITIAL ASSESSMENT COMPLETED.PLEASE REFER TO NUTRITION ASSESSMENT UNDER CARE 
ACTIVITY FOR ESTIMATED NUTRITIONAL NEEDS. 



1. CONTINUE VITAL AF 1.2 KATHARINA @ 20ML/HR,  ML Q4H.

    -IF PROPOFOL IS DISCONTINUED, RECOMMEND INCREASING VITAL AF 1.2 KATHARINA RATE TO 45 ML/HR TO 
MEET NUTRITIONAL NEEDS.

2. MONITOR GI SYMPTOMS

3. RD TO FOLLOW-UP 2-3 DAYS, HIGH RISK 



FATIMAH SHOEMAKER RD

## 2023-03-12 NOTE — NUR
PATIENT HAS BEEN RE-SCREENED AND CATEGORIZED AS HIGH NUTRITION RISK. PATIENT WILL BE SEEN 
WITHIN 1-2 DAYS OF REFERRAL RECEIVED.



3/11/23-3/13/23



FATIMAH SHOEMAKER RD

REFERRAL RECEIVED FOR TUBE FEEDING CONSULT.

## 2023-03-13 VITALS — DIASTOLIC BLOOD PRESSURE: 71 MMHG | SYSTOLIC BLOOD PRESSURE: 105 MMHG

## 2023-03-13 VITALS — SYSTOLIC BLOOD PRESSURE: 120 MMHG | DIASTOLIC BLOOD PRESSURE: 79 MMHG

## 2023-03-13 VITALS — SYSTOLIC BLOOD PRESSURE: 114 MMHG | DIASTOLIC BLOOD PRESSURE: 78 MMHG

## 2023-03-13 VITALS — DIASTOLIC BLOOD PRESSURE: 89 MMHG | SYSTOLIC BLOOD PRESSURE: 145 MMHG

## 2023-03-13 VITALS — SYSTOLIC BLOOD PRESSURE: 114 MMHG | DIASTOLIC BLOOD PRESSURE: 77 MMHG

## 2023-03-13 VITALS — DIASTOLIC BLOOD PRESSURE: 65 MMHG | SYSTOLIC BLOOD PRESSURE: 101 MMHG

## 2023-03-13 VITALS — DIASTOLIC BLOOD PRESSURE: 99 MMHG | SYSTOLIC BLOOD PRESSURE: 146 MMHG

## 2023-03-13 VITALS — SYSTOLIC BLOOD PRESSURE: 104 MMHG | DIASTOLIC BLOOD PRESSURE: 73 MMHG

## 2023-03-13 VITALS — SYSTOLIC BLOOD PRESSURE: 141 MMHG | DIASTOLIC BLOOD PRESSURE: 86 MMHG

## 2023-03-13 VITALS — DIASTOLIC BLOOD PRESSURE: 73 MMHG | SYSTOLIC BLOOD PRESSURE: 101 MMHG

## 2023-03-13 VITALS — DIASTOLIC BLOOD PRESSURE: 86 MMHG | SYSTOLIC BLOOD PRESSURE: 156 MMHG

## 2023-03-13 VITALS — DIASTOLIC BLOOD PRESSURE: 64 MMHG | SYSTOLIC BLOOD PRESSURE: 99 MMHG

## 2023-03-13 VITALS — SYSTOLIC BLOOD PRESSURE: 142 MMHG | DIASTOLIC BLOOD PRESSURE: 94 MMHG

## 2023-03-13 VITALS — DIASTOLIC BLOOD PRESSURE: 93 MMHG | SYSTOLIC BLOOD PRESSURE: 155 MMHG

## 2023-03-13 VITALS — SYSTOLIC BLOOD PRESSURE: 140 MMHG | DIASTOLIC BLOOD PRESSURE: 91 MMHG

## 2023-03-13 VITALS — SYSTOLIC BLOOD PRESSURE: 125 MMHG | DIASTOLIC BLOOD PRESSURE: 71 MMHG

## 2023-03-13 VITALS — SYSTOLIC BLOOD PRESSURE: 96 MMHG | DIASTOLIC BLOOD PRESSURE: 62 MMHG

## 2023-03-13 VITALS — SYSTOLIC BLOOD PRESSURE: 141 MMHG | DIASTOLIC BLOOD PRESSURE: 101 MMHG

## 2023-03-13 VITALS — DIASTOLIC BLOOD PRESSURE: 86 MMHG | SYSTOLIC BLOOD PRESSURE: 155 MMHG

## 2023-03-13 VITALS — DIASTOLIC BLOOD PRESSURE: 76 MMHG | SYSTOLIC BLOOD PRESSURE: 128 MMHG

## 2023-03-13 VITALS — SYSTOLIC BLOOD PRESSURE: 108 MMHG | DIASTOLIC BLOOD PRESSURE: 80 MMHG

## 2023-03-13 VITALS — SYSTOLIC BLOOD PRESSURE: 128 MMHG | DIASTOLIC BLOOD PRESSURE: 76 MMHG

## 2023-03-13 VITALS — SYSTOLIC BLOOD PRESSURE: 138 MMHG | DIASTOLIC BLOOD PRESSURE: 84 MMHG

## 2023-03-13 VITALS — SYSTOLIC BLOOD PRESSURE: 125 MMHG | DIASTOLIC BLOOD PRESSURE: 81 MMHG

## 2023-03-13 VITALS — DIASTOLIC BLOOD PRESSURE: 71 MMHG | SYSTOLIC BLOOD PRESSURE: 125 MMHG

## 2023-03-13 VITALS — SYSTOLIC BLOOD PRESSURE: 91 MMHG | DIASTOLIC BLOOD PRESSURE: 64 MMHG

## 2023-03-13 LAB
ANION GAP SERPL CALCULATED.3IONS-SCNC: 12.4 MMOL/L (ref 8–16)
BASOPHILS # BLD AUTO: 0 K/UL (ref 0–0.22)
BASOPHILS NFR BLD AUTO: 0.1 % (ref 0–2)
BUN SERPL-MCNC: 26 MG/DL (ref 7–18)
CHLORIDE SERPL-SCNC: 104 MMOL/L (ref 98–107)
CO2 SERPL-SCNC: 32.6 MMOL/L (ref 21–32)
CREAT SERPL-MCNC: 0.6 MG/DL (ref 0.6–1.3)
EOSINOPHIL # BLD AUTO: 0 K/UL (ref 0–0.4)
EOSINOPHIL NFR BLD AUTO: 0 % (ref 0–4)
ERYTHROCYTE [DISTWIDTH] IN BLOOD BY AUTOMATED COUNT: 14 % (ref 11.6–13.7)
GFR SERPL CREATININE-BSD FRML MDRD: 133 ML/MIN (ref 90–?)
GLUCOSE SERPL-MCNC: 194 MG/DL (ref 74–106)
HCT VFR BLD AUTO: 34.8 % (ref 36–48)
HGB BLD-MCNC: 11.9 G/DL (ref 12–16)
LYMPHOCYTES # BLD AUTO: 0.3 K/UL (ref 2.5–16.5)
LYMPHOCYTES NFR BLD AUTO: 3 % (ref 20.5–51.1)
MCH RBC QN AUTO: 34 PG (ref 27–31)
MCHC RBC AUTO-ENTMCNC: 34 G/DL (ref 33–37)
MCV RBC AUTO: 99.5 FL (ref 80–94)
MONOCYTES # BLD AUTO: 0.6 K/UL (ref 0.8–1)
MONOCYTES NFR BLD AUTO: 6.7 % (ref 1.7–9.3)
NEUTROPHILS # BLD AUTO: 7.7 K/UL (ref 1.8–7.7)
NEUTROPHILS NFR BLD AUTO: 90.2 % (ref 42.2–75.2)
PLATELET # BLD AUTO: 163 K/UL (ref 140–450)
POTASSIUM SERPL-SCNC: 4 MMOL/L (ref 3.5–5.1)
RBC # BLD AUTO: 3.5 MIL/UL (ref 4.2–5.4)
SODIUM SERPL-SCNC: 145 MMOL/L (ref 136–145)
WBC # BLD AUTO: 8.5 K/UL (ref 4.8–10.8)

## 2023-03-13 RX ADMIN — IPRATROPIUM BROMIDE SCH MG: 0.5 SOLUTION RESPIRATORY (INHALATION) at 16:27

## 2023-03-13 RX ADMIN — WATER SCH MG: 1 INJECTION INTRAMUSCULAR; INTRAVENOUS; SUBCUTANEOUS at 01:18

## 2023-03-13 RX ADMIN — ALBUTEROL SULFATE SCH MG: 2.5 SOLUTION RESPIRATORY (INHALATION) at 08:12

## 2023-03-13 RX ADMIN — ALBUTEROL SULFATE SCH MG: 2.5 SOLUTION RESPIRATORY (INHALATION) at 16:27

## 2023-03-13 RX ADMIN — ALBUTEROL SULFATE SCH MG: 2.5 SOLUTION RESPIRATORY (INHALATION) at 18:48

## 2023-03-13 RX ADMIN — NICARDIPINE HYDROCHLORIDE PRN MLS/HR: 25 INJECTION INTRAVENOUS at 09:00

## 2023-03-13 RX ADMIN — IPRATROPIUM BROMIDE SCH MG: 0.5 SOLUTION RESPIRATORY (INHALATION) at 23:15

## 2023-03-13 RX ADMIN — ALBUTEROL SULFATE SCH MG: 2.5 SOLUTION RESPIRATORY (INHALATION) at 01:10

## 2023-03-13 RX ADMIN — NICARDIPINE HYDROCHLORIDE PRN MLS/HR: 25 INJECTION INTRAVENOUS at 02:00

## 2023-03-13 RX ADMIN — WATER SCH MG: 1 INJECTION INTRAMUSCULAR; INTRAVENOUS; SUBCUTANEOUS at 20:32

## 2023-03-13 RX ADMIN — ALBUTEROL SULFATE SCH MG: 2.5 SOLUTION RESPIRATORY (INHALATION) at 23:15

## 2023-03-13 RX ADMIN — PROPOFOL PRN MLS/HR: 10 INJECTION, EMULSION INTRAVENOUS at 02:02

## 2023-03-13 RX ADMIN — ENOXAPARIN SODIUM SCH MG: 40 INJECTION SUBCUTANEOUS at 09:06

## 2023-03-13 RX ADMIN — ALBUTEROL SULFATE SCH MG: 2.5 SOLUTION RESPIRATORY (INHALATION) at 11:23

## 2023-03-13 RX ADMIN — WATER SCH MG: 1 INJECTION INTRAMUSCULAR; INTRAVENOUS; SUBCUTANEOUS at 05:14

## 2023-03-13 RX ADMIN — PROPOFOL PRN MLS/HR: 10 INJECTION, EMULSION INTRAVENOUS at 13:20

## 2023-03-13 RX ADMIN — IPRATROPIUM BROMIDE SCH MG: 0.5 SOLUTION RESPIRATORY (INHALATION) at 11:23

## 2023-03-13 RX ADMIN — WATER SCH MG: 1 INJECTION INTRAMUSCULAR; INTRAVENOUS; SUBCUTANEOUS at 12:33

## 2023-03-13 RX ADMIN — IPRATROPIUM BROMIDE SCH MG: 0.5 SOLUTION RESPIRATORY (INHALATION) at 18:48

## 2023-03-13 RX ADMIN — DEXTROSE PRN MLS/HR: 50 INJECTION, SOLUTION INTRAVENOUS at 18:25

## 2023-03-13 RX ADMIN — IPRATROPIUM BROMIDE SCH MG: 0.5 SOLUTION RESPIRATORY (INHALATION) at 08:12

## 2023-03-13 RX ADMIN — PROPOFOL PRN MLS/HR: 10 INJECTION, EMULSION INTRAVENOUS at 09:03

## 2023-03-13 RX ADMIN — PROPOFOL PRN MLS/HR: 10 INJECTION, EMULSION INTRAVENOUS at 05:14

## 2023-03-13 RX ADMIN — DEXTROSE PRN MLS/HR: 50 INJECTION, SOLUTION INTRAVENOUS at 11:43

## 2023-03-13 RX ADMIN — PROPOFOL PRN MLS/HR: 10 INJECTION, EMULSION INTRAVENOUS at 18:35

## 2023-03-13 RX ADMIN — IPRATROPIUM BROMIDE SCH MG: 0.5 SOLUTION RESPIRATORY (INHALATION) at 01:10

## 2023-03-13 NOTE — NUR
@1335 PT WAS PLACED ON CPAP 10/5 24%. PT IS TOLERATING CPAP WELL. NO RESP DISTRESS NOTED. HR 
104 SPO2 96%. RN AWARE OF CPAP STARTED. BACKUP VENT SETTINGS IN PLACE. ALARMS ARE SEYT AND 
HEARD.

## 2023-03-13 NOTE — NUR
ROUNDED ON PT. PT IS AWAKE WITH A VENT SETTINGS OF PC 30 RR 14 TINSP 0.9 24% +5. NO DISTRESS 
NOTED. VENT PLUGGED INTO RED OUTLET AND AMBU BAG AT BEDSIDE. TX GIVEN. COURSE BREATHE SOUNDS 
HEARD BILATERALLY WHEN SXNED MINIMAL SECRETION COLLECTED. WILL CONTINUE TO MONITOR.

## 2023-03-13 NOTE — NUR
ASSUMED CARE OF THIS PATIENT AND ASSESSMENT DONE AND COMPLETED.AFEBRILE.SEDATED ON PROPOFOL 
25,MCG,FENTANYL 50 MCG AND PRECEDEX 0.8 MCG.SR ON THE MONITOR HR 84..ON VENT SETTINGS AS 
ACPC  14 FIO2 24% PEEP 5 WITH O2 SAT AT 96%.LUNG SOUNDS WITH RHONCHI THROUGHOUT .ABDOMEN 
OBESE WITH = BS X4 QUADS.JAIN DRAINING ADEQUATE AMOUNT OF IRINE. ALL PULSES PRESENT AND 
PALPABLE.WILL PROCEED WITH CONTINUITY OF CARE.

## 2023-03-13 NOTE — NUR
RECEIVED BEDSIDE REPORT FROM NIGHT SHIFT RN, APRIL, FOR CONTINUITY OF CARE. PT AWAKE AND 
FOLLOWS COMMANDS. ETT TO VENT. SR ON MONITOR, TLC TO L SUBCLAVIAN INFUSING PROPOFOL AT 60 
MCG/KG/MIN, FENTANYL AT 2 MCG/KG/HR, NS TKO. OGT TO TUBE FEEDING 20ML/HR WITH FWF 100ML Q4H. 
BILATERAL SOFT WRIST RESTRAINTS IN PLACE FOR SAFETY, NO S/SX OF INJURY. STANDARD PRECAUTION. 
HOB 30 DEGREES, BED LOCKED AND IN LOWEST POSITION.

## 2023-03-13 NOTE — NUR
Pt. on bed sedated on Propofol and Fentanyl Drips. Diego. SWR in place to prevent extubation. 
SR to ST on the monitor. On vent via ETT with settings as follows AC PC Rate=14 Pressure=3 
PEEP (+)5 FiO2=24% with U4Lcd=76 to 99%. ABG done; Dr. Brown paged x 1 by RT with no 
response @ this time. Tolerated Vital @ 20 ml/hr with no residuals @ this time. Due meds. 
adm. Cardiac and resp. monitoring cont. Will cont. to monitoe.

## 2023-03-13 NOTE — NUR
SEEN AND EXAMINED BY DR. LLANES. ORDERS TO RESTART PRECEDEX AND WEAN PROPOFOL AND FENTANYL. 
ATTEMPT CPAP TRIALS LATER. DISCUSSED WITH RT AT BEDSIDE.

## 2023-03-13 NOTE — NUR
ROUNDED WITH MARIO ALBERTO ON THIS PT NEW ORDERS TO START CPAP TRIALS ON PT. MAMTAR ALSO DECREASED 
THE PINSP TO 20 FROM PINSP 30. PT TOELRATING CHANGES WELL RN AWARE. NO DISTRESS NOTED.

## 2023-03-14 VITALS — SYSTOLIC BLOOD PRESSURE: 100 MMHG | DIASTOLIC BLOOD PRESSURE: 70 MMHG

## 2023-03-14 VITALS — DIASTOLIC BLOOD PRESSURE: 75 MMHG | SYSTOLIC BLOOD PRESSURE: 115 MMHG

## 2023-03-14 VITALS — SYSTOLIC BLOOD PRESSURE: 141 MMHG | DIASTOLIC BLOOD PRESSURE: 93 MMHG

## 2023-03-14 VITALS — SYSTOLIC BLOOD PRESSURE: 124 MMHG | DIASTOLIC BLOOD PRESSURE: 92 MMHG

## 2023-03-14 VITALS — DIASTOLIC BLOOD PRESSURE: 86 MMHG | SYSTOLIC BLOOD PRESSURE: 134 MMHG

## 2023-03-14 VITALS — SYSTOLIC BLOOD PRESSURE: 131 MMHG | DIASTOLIC BLOOD PRESSURE: 82 MMHG

## 2023-03-14 VITALS — SYSTOLIC BLOOD PRESSURE: 99 MMHG | DIASTOLIC BLOOD PRESSURE: 70 MMHG

## 2023-03-14 VITALS — DIASTOLIC BLOOD PRESSURE: 85 MMHG | SYSTOLIC BLOOD PRESSURE: 127 MMHG

## 2023-03-14 VITALS — DIASTOLIC BLOOD PRESSURE: 72 MMHG | SYSTOLIC BLOOD PRESSURE: 105 MMHG

## 2023-03-14 VITALS — DIASTOLIC BLOOD PRESSURE: 75 MMHG | SYSTOLIC BLOOD PRESSURE: 122 MMHG

## 2023-03-14 VITALS — DIASTOLIC BLOOD PRESSURE: 65 MMHG | SYSTOLIC BLOOD PRESSURE: 106 MMHG

## 2023-03-14 VITALS — DIASTOLIC BLOOD PRESSURE: 82 MMHG | SYSTOLIC BLOOD PRESSURE: 134 MMHG

## 2023-03-14 VITALS — SYSTOLIC BLOOD PRESSURE: 109 MMHG | DIASTOLIC BLOOD PRESSURE: 69 MMHG

## 2023-03-14 VITALS — SYSTOLIC BLOOD PRESSURE: 90 MMHG | DIASTOLIC BLOOD PRESSURE: 64 MMHG

## 2023-03-14 VITALS — DIASTOLIC BLOOD PRESSURE: 77 MMHG | SYSTOLIC BLOOD PRESSURE: 116 MMHG

## 2023-03-14 VITALS — DIASTOLIC BLOOD PRESSURE: 88 MMHG | SYSTOLIC BLOOD PRESSURE: 140 MMHG

## 2023-03-14 VITALS — DIASTOLIC BLOOD PRESSURE: 70 MMHG | SYSTOLIC BLOOD PRESSURE: 111 MMHG

## 2023-03-14 VITALS — SYSTOLIC BLOOD PRESSURE: 123 MMHG | DIASTOLIC BLOOD PRESSURE: 62 MMHG

## 2023-03-14 VITALS — DIASTOLIC BLOOD PRESSURE: 92 MMHG | SYSTOLIC BLOOD PRESSURE: 144 MMHG

## 2023-03-14 VITALS — SYSTOLIC BLOOD PRESSURE: 129 MMHG | DIASTOLIC BLOOD PRESSURE: 79 MMHG

## 2023-03-14 LAB
ANION GAP SERPL CALCULATED.3IONS-SCNC: 11 MMOL/L (ref 8–16)
BASOPHILS # BLD AUTO: 0 K/UL (ref 0–0.22)
BASOPHILS NFR BLD AUTO: 0.2 % (ref 0–2)
BUN SERPL-MCNC: 26 MG/DL (ref 7–18)
CHLORIDE SERPL-SCNC: 100 MMOL/L (ref 98–107)
CO2 SERPL-SCNC: 32.5 MMOL/L (ref 21–32)
CREAT SERPL-MCNC: 0.6 MG/DL (ref 0.6–1.3)
EOSINOPHIL # BLD AUTO: 0 K/UL (ref 0–0.4)
EOSINOPHIL NFR BLD AUTO: 0 % (ref 0–4)
ERYTHROCYTE [DISTWIDTH] IN BLOOD BY AUTOMATED COUNT: 14.8 % (ref 11.6–13.7)
GFR SERPL CREATININE-BSD FRML MDRD: 133 ML/MIN (ref 90–?)
GLUCOSE SERPL-MCNC: 209 MG/DL (ref 74–106)
HCT VFR BLD AUTO: 38.5 % (ref 36–48)
HGB BLD-MCNC: 12.8 G/DL (ref 12–16)
LYMPHOCYTES # BLD AUTO: 0.4 K/UL (ref 2.5–16.5)
LYMPHOCYTES NFR BLD AUTO: 3.4 % (ref 20.5–51.1)
MCH RBC QN AUTO: 33 PG (ref 27–31)
MCHC RBC AUTO-ENTMCNC: 33 G/DL (ref 33–37)
MCV RBC AUTO: 100 FL (ref 80–94)
MONOCYTES # BLD AUTO: 0.7 K/UL (ref 0.8–1)
MONOCYTES NFR BLD AUTO: 6.1 % (ref 1.7–9.3)
NEUTROPHILS # BLD AUTO: 10.6 K/UL (ref 1.8–7.7)
NEUTROPHILS NFR BLD AUTO: 90.3 % (ref 42.2–75.2)
PLATELET # BLD AUTO: 152 K/UL (ref 140–450)
POTASSIUM SERPL-SCNC: 4.5 MMOL/L (ref 3.5–5.1)
RBC # BLD AUTO: 3.85 MIL/UL (ref 4.2–5.4)
SODIUM SERPL-SCNC: 139 MMOL/L (ref 136–145)
WBC # BLD AUTO: 11.7 K/UL (ref 4.8–10.8)

## 2023-03-14 RX ADMIN — ENOXAPARIN SODIUM SCH MG: 40 INJECTION SUBCUTANEOUS at 09:52

## 2023-03-14 RX ADMIN — ALBUTEROL SULFATE SCH MG: 2.5 SOLUTION RESPIRATORY (INHALATION) at 20:38

## 2023-03-14 RX ADMIN — IPRATROPIUM BROMIDE SCH MG: 0.5 SOLUTION RESPIRATORY (INHALATION) at 03:17

## 2023-03-14 RX ADMIN — IPRATROPIUM BROMIDE SCH MG: 0.5 SOLUTION RESPIRATORY (INHALATION) at 19:00

## 2023-03-14 RX ADMIN — WATER SCH MG: 1 INJECTION INTRAMUSCULAR; INTRAVENOUS; SUBCUTANEOUS at 21:17

## 2023-03-14 RX ADMIN — IPRATROPIUM BROMIDE SCH MG: 0.5 SOLUTION RESPIRATORY (INHALATION) at 16:35

## 2023-03-14 RX ADMIN — DEXTROSE PRN MLS/HR: 50 INJECTION, SOLUTION INTRAVENOUS at 00:22

## 2023-03-14 RX ADMIN — DEXTROSE PRN MLS/HR: 50 INJECTION, SOLUTION INTRAVENOUS at 06:16

## 2023-03-14 RX ADMIN — PROPOFOL PRN MLS/HR: 10 INJECTION, EMULSION INTRAVENOUS at 02:16

## 2023-03-14 RX ADMIN — WATER SCH MG: 1 INJECTION INTRAMUSCULAR; INTRAVENOUS; SUBCUTANEOUS at 04:11

## 2023-03-14 RX ADMIN — IPRATROPIUM BROMIDE SCH MG: 0.5 SOLUTION RESPIRATORY (INHALATION) at 22:22

## 2023-03-14 RX ADMIN — IPRATROPIUM BROMIDE SCH MG: 0.5 SOLUTION RESPIRATORY (INHALATION) at 16:16

## 2023-03-14 RX ADMIN — PROPOFOL PRN MLS/HR: 10 INJECTION, EMULSION INTRAVENOUS at 08:07

## 2023-03-14 RX ADMIN — ALBUTEROL SULFATE SCH MG: 2.5 SOLUTION RESPIRATORY (INHALATION) at 22:22

## 2023-03-14 RX ADMIN — ALBUTEROL SULFATE SCH MG: 2.5 SOLUTION RESPIRATORY (INHALATION) at 16:36

## 2023-03-14 RX ADMIN — DEXTROSE PRN MLS/HR: 50 INJECTION, SOLUTION INTRAVENOUS at 15:18

## 2023-03-14 RX ADMIN — DEXTROSE PRN MLS/HR: 50 INJECTION, SOLUTION INTRAVENOUS at 18:32

## 2023-03-14 RX ADMIN — WATER SCH MG: 1 INJECTION INTRAMUSCULAR; INTRAVENOUS; SUBCUTANEOUS at 12:46

## 2023-03-14 RX ADMIN — DEXTROSE PRN MLS/HR: 50 INJECTION, SOLUTION INTRAVENOUS at 22:54

## 2023-03-14 RX ADMIN — ALBUTEROL SULFATE SCH MG: 2.5 SOLUTION RESPIRATORY (INHALATION) at 03:17

## 2023-03-14 RX ADMIN — ALBUTEROL SULFATE SCH MG: 2.5 SOLUTION RESPIRATORY (INHALATION) at 16:16

## 2023-03-14 NOTE — NUR
RESPONDED TO ICU CALL DUE TO PT SELF EXTUBATION. PT NOT IN ANY RESPIRATORY DISTRESS BUT 
AGITATED. NURSE IS BEDSIDE. ER PHYSICIAN BEDSIDE ASSESSING PT. NO NEED FOR RE INTUBATION AT 
THIS TIME.

## 2023-03-14 NOTE — NUR
Patient is resting in bed, no distress, noted. Bilateral mittens remain in place for safety. 
Precedex at 1.5mcg/kg/hr. Patient is tolerating 3LNC well. No apparent distress noted. 
Report given to oncoming RNMarleny.

## 2023-03-14 NOTE — NUR
0859 Patient is resting in bed, nods appropriately, bilateral wrist restraints in place, 
secured to rail. Called to bedside by ICU RN, patient leaned forward and dislodged ETT. NRB 
100% placed on patient, SPO2 sats 99%, Propofol anf Fentanyl stopped , Precedex increased to 
1.2mcg/kg/hr for sedation. ED MD Estrada and RT called to bedside to assess. 0901 Dr Brown 
paged and notified, new orders received. 0905 Patient placed on 2LNC , SPO2 96%, VSS. 
Patient is Icelandic speaking, asking for water, no apparent distress noted at present. 
Bilateral soft wrist restraints changed to mittens, ED MD spoke with Stephanie regarding 
assessment and patient current status. Will continue to monitor patient.

## 2023-03-14 NOTE — NUR
0317 PATIENT HAS COPIOUS AMOUNTS OF PINK FROTHY SECRETIONS. SXNED AND LAVAGED TO CLEAR 
SECRETIONS. PATIENTS COLOR DUSKY. AND PATIENT AGONAL BREATHING.INCREASED FIO2 %. SAT 
PATIENT UP AND INCREASED PEEP TO 8CM. GAVE INLINE HHNTX.PATIENT HAS BILAT WHEEZING.RN 
INCREASED PROPOFOL DUE TO PATIENT MOVING AND THRASHING IN BED. PATIENT IMPROVED AFTER DOING 
THESE CHANGES

## 2023-03-14 NOTE — NUR
BEDSIDE AWARE OF PT SELF EXTUBATION. PHYSICIAN STATES TO KEEP PT ON 3L NASAL 
CANNULA. PT NOT IN ANY DISTRESS AT THIS TIME.

## 2023-03-14 NOTE — NUR
PT. NOT APPROPRIATE FOR ST SWALLOW EVALUATION AT THIS TIME. MAY REEVALUATE PT. AT A MORE 
APPROPRIATE TIME. RN MADE AWARE.

## 2023-03-14 NOTE — NUR
Received report. Patient is resting in bed, eyes closed, arouses to touch, appears 
comfortable, tolerating mechanical ventilation well. Bilateral soft wrist restraints in 
place, no sign of injury noted, secured to non-moving part of bed. Patient is sedated to 
RASS -2 with Propofol, Precedex and Fentanyl gtts. VSS on telemetry, NSR noted. No acute 
distress noted at present, will continue to monitor patient.

## 2023-03-15 VITALS — SYSTOLIC BLOOD PRESSURE: 118 MMHG | DIASTOLIC BLOOD PRESSURE: 67 MMHG

## 2023-03-15 VITALS — DIASTOLIC BLOOD PRESSURE: 85 MMHG | SYSTOLIC BLOOD PRESSURE: 148 MMHG

## 2023-03-15 VITALS — SYSTOLIC BLOOD PRESSURE: 145 MMHG | DIASTOLIC BLOOD PRESSURE: 90 MMHG

## 2023-03-15 VITALS — DIASTOLIC BLOOD PRESSURE: 82 MMHG | SYSTOLIC BLOOD PRESSURE: 131 MMHG

## 2023-03-15 VITALS — DIASTOLIC BLOOD PRESSURE: 88 MMHG | SYSTOLIC BLOOD PRESSURE: 128 MMHG

## 2023-03-15 VITALS — DIASTOLIC BLOOD PRESSURE: 88 MMHG | SYSTOLIC BLOOD PRESSURE: 131 MMHG

## 2023-03-15 VITALS — DIASTOLIC BLOOD PRESSURE: 79 MMHG | SYSTOLIC BLOOD PRESSURE: 133 MMHG

## 2023-03-15 VITALS — SYSTOLIC BLOOD PRESSURE: 142 MMHG | DIASTOLIC BLOOD PRESSURE: 90 MMHG

## 2023-03-15 VITALS — DIASTOLIC BLOOD PRESSURE: 88 MMHG | SYSTOLIC BLOOD PRESSURE: 146 MMHG

## 2023-03-15 VITALS — DIASTOLIC BLOOD PRESSURE: 74 MMHG | SYSTOLIC BLOOD PRESSURE: 123 MMHG

## 2023-03-15 VITALS — SYSTOLIC BLOOD PRESSURE: 123 MMHG | DIASTOLIC BLOOD PRESSURE: 82 MMHG

## 2023-03-15 LAB
ANION GAP SERPL CALCULATED.3IONS-SCNC: 9.2 MMOL/L (ref 8–16)
BASOPHILS # BLD AUTO: 0 K/UL (ref 0–0.22)
BASOPHILS NFR BLD AUTO: 0.1 % (ref 0–2)
BUN SERPL-MCNC: 25 MG/DL (ref 7–18)
CHLORIDE SERPL-SCNC: 100 MMOL/L (ref 98–107)
CO2 SERPL-SCNC: 30.6 MMOL/L (ref 21–32)
CREAT SERPL-MCNC: 0.5 MG/DL (ref 0.6–1.3)
EOSINOPHIL # BLD AUTO: 0 K/UL (ref 0–0.4)
EOSINOPHIL NFR BLD AUTO: 0 % (ref 0–4)
ERYTHROCYTE [DISTWIDTH] IN BLOOD BY AUTOMATED COUNT: 14.3 % (ref 11.6–13.7)
GFR SERPL CREATININE-BSD FRML MDRD: 164 ML/MIN (ref 90–?)
GLUCOSE SERPL-MCNC: 166 MG/DL (ref 74–106)
HCT VFR BLD AUTO: 37.4 % (ref 36–48)
HGB BLD-MCNC: 12.8 G/DL (ref 12–16)
LYMPHOCYTES # BLD AUTO: 0.3 K/UL (ref 2.5–16.5)
LYMPHOCYTES NFR BLD AUTO: 3.1 % (ref 20.5–51.1)
MCH RBC QN AUTO: 34 PG (ref 27–31)
MCHC RBC AUTO-ENTMCNC: 34 G/DL (ref 33–37)
MCV RBC AUTO: 99 FL (ref 80–94)
MONOCYTES # BLD AUTO: 0.5 K/UL (ref 0.8–1)
MONOCYTES NFR BLD AUTO: 4.5 % (ref 1.7–9.3)
NEUTROPHILS # BLD AUTO: 10.4 K/UL (ref 1.8–7.7)
NEUTROPHILS NFR BLD AUTO: 92.3 % (ref 42.2–75.2)
PLATELET # BLD AUTO: 143 K/UL (ref 140–450)
POTASSIUM SERPL-SCNC: 3.8 MMOL/L (ref 3.5–5.1)
RBC # BLD AUTO: 3.78 MIL/UL (ref 4.2–5.4)
SODIUM SERPL-SCNC: 136 MMOL/L (ref 136–145)
WBC # BLD AUTO: 11.3 K/UL (ref 4.8–10.8)

## 2023-03-15 RX ADMIN — ENOXAPARIN SODIUM SCH MG: 40 INJECTION SUBCUTANEOUS at 09:50

## 2023-03-15 RX ADMIN — IPRATROPIUM BROMIDE AND ALBUTEROL SULFATE SCH ML: .5; 3 SOLUTION RESPIRATORY (INHALATION) at 19:00

## 2023-03-15 RX ADMIN — IPRATROPIUM BROMIDE SCH MG: 0.5 SOLUTION RESPIRATORY (INHALATION) at 02:48

## 2023-03-15 RX ADMIN — IPRATROPIUM BROMIDE AND ALBUTEROL SULFATE SCH ML: .5; 3 SOLUTION RESPIRATORY (INHALATION) at 12:10

## 2023-03-15 RX ADMIN — IPRATROPIUM BROMIDE AND ALBUTEROL SULFATE SCH ML: .5; 3 SOLUTION RESPIRATORY (INHALATION) at 16:03

## 2023-03-15 RX ADMIN — WATER SCH MG: 1 INJECTION INTRAMUSCULAR; INTRAVENOUS; SUBCUTANEOUS at 04:56

## 2023-03-15 RX ADMIN — DEXTROSE PRN MLS/HR: 50 INJECTION, SOLUTION INTRAVENOUS at 02:41

## 2023-03-15 RX ADMIN — ALBUTEROL SULFATE SCH MG: 2.5 SOLUTION RESPIRATORY (INHALATION) at 02:48

## 2023-03-15 RX ADMIN — WATER SCH MG: 1 INJECTION INTRAMUSCULAR; INTRAVENOUS; SUBCUTANEOUS at 20:18

## 2023-03-15 RX ADMIN — DEXTROSE PRN MLS/HR: 50 INJECTION, SOLUTION INTRAVENOUS at 06:30

## 2023-03-15 NOTE — NUR
Received report from off going nurse. Patient is resting in bed, no apparent distress noted. 
Bilateral mittens removed. Patient is arouses to verbal, requires redirection. Precedex gtt 
at 1.5mcg/kg/hr for sedation. Will continue to monitor.

## 2023-03-15 NOTE — NUR
PATIENT WAS AGITATED; TRYING TO GET OUT OF BED; AND WOULD NOT SETTLE DOWN. HR ; BP 
/88. ADMINISTERED ATIVAN TO PATIENT TO HELP PATIENT RELAX. PATIENT'S BREATHING IS 
NORMAL WITH SYMMETRICAL RISE AND FALL OF CHEST. WILL CONTINUE TO OBSERVE PATIENT.

## 2023-03-15 NOTE — NUR
RECEIVED REPORT FROM DAY SHIFT NURSE EDMOND FOR CONTINUITY OF CARE. PATIENT IS A&O X1-2; 
Maltese SPEAKING. PATIENT IS CONFUSED. PATIENT IS ON NC 2L, BREATHING IS NORMAL WITH 
SYMMETRICAL RISE AND FALL OF CHEST. IV IS A TRIPLE PICC LINE IN THE LEFT SUBCLAVIAN, NO 
FLUIDS RUNNING (SALINE LOCKED). PATIENT IS AWAKE, LYING SEMI-FOWLERS POSITION. SON IS AT 
BEDSIDE. BED IS IN LOWEST POSITION, WHEELS LOCKED, CALL LIGHT IN PLACE. WILL CONTINUE TO 
OBSERVE PATIENT.

## 2023-03-15 NOTE — NUR
3/15/23 RD FOLLOW UP COMPLETED



PLEASE REFER TO NUTRITION ASSESSMENT UNDER CARE ACTIVITY FOR ESTIMATED NUTRITIONAL NEEDS. 



1. CONTINUE NPO, PER MD.

2. IF PT PASSES ST EVAL, RECOMMEND CCHO 60 GRAM DIET, WITH MODIFIED TEXTURE, PER ST.

- IF PT DOES NOT PASS ST EVAL, WHEN/IF MEDICALLY APPROPRIATE, AND IF PT BEGINS GTUBE 
FEEDING, RECOMMEND INCREASING VITAL AF 1.2 KATHARINA RATE TO 45 ML/HR,  ML Q6H TO MEET 
NUTRITIONAL NEEDS, WILL PROVIDE 1080 ML VOLUME, 1296 KCALS, 81 G PROTEIN, AND 1275 ML FREE 
WATER, MEETING 100% ESTIMATED CALORIE/ PROTEIN NEEDS, ADEQUATE

3. MONITOR LAB VALUES. 

4. RD TO FOLLOW-UP 2-3 DAYS, HIGH RISK



REVIEWED BY RINA BRADY RD

## 2023-03-16 VITALS — DIASTOLIC BLOOD PRESSURE: 87 MMHG | SYSTOLIC BLOOD PRESSURE: 138 MMHG

## 2023-03-16 VITALS — SYSTOLIC BLOOD PRESSURE: 142 MMHG | DIASTOLIC BLOOD PRESSURE: 87 MMHG

## 2023-03-16 VITALS — SYSTOLIC BLOOD PRESSURE: 145 MMHG | DIASTOLIC BLOOD PRESSURE: 109 MMHG

## 2023-03-16 VITALS — SYSTOLIC BLOOD PRESSURE: 151 MMHG | DIASTOLIC BLOOD PRESSURE: 95 MMHG

## 2023-03-16 VITALS — SYSTOLIC BLOOD PRESSURE: 151 MMHG | DIASTOLIC BLOOD PRESSURE: 83 MMHG

## 2023-03-16 VITALS — DIASTOLIC BLOOD PRESSURE: 81 MMHG | SYSTOLIC BLOOD PRESSURE: 149 MMHG

## 2023-03-16 LAB
EOSINOPHIL NFR BLD MANUAL: 1 % (ref 0–4)
ERYTHROCYTE [DISTWIDTH] IN BLOOD BY AUTOMATED COUNT: 14.4 % (ref 11.6–13.7)
HCT VFR BLD AUTO: 42.3 % (ref 36–48)
HGB BLD-MCNC: 14.4 G/DL (ref 12–16)
LYMPHOCYTES NFR BLD MANUAL: 3 % (ref 20–46)
MCH RBC QN AUTO: 33 PG (ref 27–31)
MCHC RBC AUTO-ENTMCNC: 34 G/DL (ref 33–37)
MCV RBC AUTO: 98 FL (ref 80–94)
MONOCYTES NFR BLD MANUAL: 6 % (ref 5–12)
MYELOCYTES NFR BLD MANUAL: 2 % (ref 0–0)
PLATELET # BLD AUTO: 162 K/UL (ref 140–450)
PROMYELOCYTES NFR BLD MANUAL: 1 % (ref 0–0)
RBC # BLD AUTO: 4.31 MIL/UL (ref 4.2–5.4)
WBC # BLD AUTO: 12.1 K/UL (ref 4.8–10.8)

## 2023-03-16 RX ADMIN — IPRATROPIUM BROMIDE AND ALBUTEROL SULFATE SCH ML: .5; 3 SOLUTION RESPIRATORY (INHALATION) at 03:37

## 2023-03-16 RX ADMIN — IPRATROPIUM BROMIDE AND ALBUTEROL SULFATE SCH ML: .5; 3 SOLUTION RESPIRATORY (INHALATION) at 14:03

## 2023-03-16 RX ADMIN — IPRATROPIUM BROMIDE AND ALBUTEROL SULFATE SCH ML: .5; 3 SOLUTION RESPIRATORY (INHALATION) at 03:36

## 2023-03-16 RX ADMIN — IPRATROPIUM BROMIDE AND ALBUTEROL SULFATE SCH ML: .5; 3 SOLUTION RESPIRATORY (INHALATION) at 07:42

## 2023-03-16 RX ADMIN — IPRATROPIUM BROMIDE AND ALBUTEROL SULFATE SCH ML: .5; 3 SOLUTION RESPIRATORY (INHALATION) at 21:11

## 2023-03-16 RX ADMIN — IPRATROPIUM BROMIDE AND ALBUTEROL SULFATE SCH ML: .5; 3 SOLUTION RESPIRATORY (INHALATION) at 11:33

## 2023-03-16 RX ADMIN — WATER SCH MG: 1 INJECTION INTRAMUSCULAR; INTRAVENOUS; SUBCUTANEOUS at 09:00

## 2023-03-16 RX ADMIN — ENOXAPARIN SODIUM SCH MG: 40 INJECTION SUBCUTANEOUS at 09:00

## 2023-03-16 RX ADMIN — POTASSIUM CHLORIDE PRN MLS/HR: 200 INJECTION, SOLUTION INTRAVENOUS at 13:55

## 2023-03-16 RX ADMIN — WATER SCH MG: 1 INJECTION INTRAMUSCULAR; INTRAVENOUS; SUBCUTANEOUS at 21:17

## 2023-03-16 RX ADMIN — IPRATROPIUM BROMIDE AND ALBUTEROL SULFATE SCH ML: .5; 3 SOLUTION RESPIRATORY (INHALATION) at 23:56

## 2023-03-16 NOTE — NUR
WHILE CHECKING PATIENT'S CATHETER NOTICED THAT CATHETER TUBE WAS LYING HIRER THAN IT SHOULD 
ON PATIENT. CHECKED PATIENT'S LEG; PATIENT HAD BROKEN OFF NEW STATLOCK THAT WAS PLACED. 
REAPPLIED CATHETER TUBE TO PATIENT'S THIGH, SECURING WITH TAPE AND RECHECKED PATIENT'S 
RESTRAINTS. EDUCATED THE PATIENT ON THE IMPORTANCE OF NOT PULLING ON HER CATHETER. 
REINFORCEMENT IS NEEDED. WILL CONTINUE TO OBSERVE PATIENT.

## 2023-03-16 NOTE — NUR
RECEIVED REPORT FROM DAY SHIFT RN FOR CONTINUITY OF CARE. PT IS CURRENTLY OFF RESTRAINTS. NO 
RENEWAL ORDERER PER DAY SHIFT RN. WILL MONITOR PT AND REORIENTED PT AS NEEDED.  BY 
BEDSIDE. PT IS Surinamese SPEAKER AND IS CONFUSED. PER  THIS IS NOT THE PT BASELINE. PT 
IS ON 2L NC SATING 94%. PT HAS FC DRAINING TO GRAVITY.

## 2023-03-16 NOTE — NUR
PATIENT APPEARED TO BE IN PAIN. RESTLESS AND GRIMACING. ASKED PATIENT IF SHE WAS IN PAIN. 
PATIENT STATED, "SI". CHECKED PATIENT'S BP; BP /110. CHECKED PATIENT'S CHART; 
MORPHINE WAS APPROPRIATE TO ADMINISTER. GAVE PATIENT MORPHINE FOR PAIN. PATIENT TOLERATED 
WELL. WILL CONTINUE TO OBSERVE PATIENT.

## 2023-03-16 NOTE — NUR
PATIENT CONTINUES TO TRY TO GET OUT OF BED. PULLS ON CATHETER (NEW STATLOCK WAS PUT ON 
PATIENT'S LEG; PATIENT BROKE OTHER ONE). PATIENT'S BREATHING IS NORMAL WITH SYMMETRICAL RISE 
AND FALL OF CHEST. WILL CONTINUE TO OBSERVE PATIENT.

## 2023-03-16 NOTE — NUR
OBTAINED PATIENT'S 0400 VITALS; BP /87. PATIENT IS STILL SINUS TACH  HEART 
RATE. BREATHING IS NORMAL WITH SYMMETRICAL RISE AND FALL OF CHEST. WILL CONTINUE TO OBSERVE 
PATIENT.

## 2023-03-16 NOTE — NUR
PATIENT WAS PUT BACK ON TO RESTRAINTS, TO ENSURE PATIENT SAFETY. PATIENT'S BREATHING IS 
NORMAL WITH SYMMETRICAL RISE AND FALL OF CHEST. WILL CONTINUE TO OBSERVE PATIENT.

## 2023-03-16 NOTE — NUR
ASKED PHYSICIAN IF WE COULD RENEW PTS RESTRAINT ORDER AND HE RESPONDED FOR US TO GIVE PT 
ATIVAN AND REMOVE RESTRAINTS AT THIS TIME.

## 2023-03-17 VITALS — DIASTOLIC BLOOD PRESSURE: 76 MMHG | SYSTOLIC BLOOD PRESSURE: 132 MMHG

## 2023-03-17 VITALS — SYSTOLIC BLOOD PRESSURE: 147 MMHG | DIASTOLIC BLOOD PRESSURE: 99 MMHG

## 2023-03-17 VITALS — DIASTOLIC BLOOD PRESSURE: 86 MMHG | SYSTOLIC BLOOD PRESSURE: 135 MMHG

## 2023-03-17 VITALS — DIASTOLIC BLOOD PRESSURE: 96 MMHG | SYSTOLIC BLOOD PRESSURE: 163 MMHG

## 2023-03-17 VITALS — DIASTOLIC BLOOD PRESSURE: 92 MMHG | SYSTOLIC BLOOD PRESSURE: 139 MMHG

## 2023-03-17 VITALS — SYSTOLIC BLOOD PRESSURE: 146 MMHG | DIASTOLIC BLOOD PRESSURE: 87 MMHG

## 2023-03-17 LAB
ANION GAP SERPL CALCULATED.3IONS-SCNC: 12.6 MMOL/L (ref 8–16)
BASOPHILS # BLD AUTO: 0 K/UL (ref 0–0.22)
BASOPHILS NFR BLD AUTO: 0.1 % (ref 0–2)
BUN SERPL-MCNC: 24 MG/DL (ref 7–18)
CHLORIDE SERPL-SCNC: 105 MMOL/L (ref 98–107)
CO2 SERPL-SCNC: 27 MMOL/L (ref 21–32)
CREAT SERPL-MCNC: 0.7 MG/DL (ref 0.6–1.3)
EOSINOPHIL # BLD AUTO: 0 K/UL (ref 0–0.4)
EOSINOPHIL NFR BLD AUTO: 0.1 % (ref 0–4)
ERYTHROCYTE [DISTWIDTH] IN BLOOD BY AUTOMATED COUNT: 14.6 % (ref 11.6–13.7)
GFR SERPL CREATININE-BSD FRML MDRD: 111 ML/MIN (ref 90–?)
GLUCOSE SERPL-MCNC: 107 MG/DL (ref 74–106)
HCT VFR BLD AUTO: 43.8 % (ref 36–48)
HGB BLD-MCNC: 14.9 G/DL (ref 12–16)
LYMPHOCYTES # BLD AUTO: 0.7 K/UL (ref 2.5–16.5)
LYMPHOCYTES NFR BLD AUTO: 5.7 % (ref 20.5–51.1)
MCH RBC QN AUTO: 33 PG (ref 27–31)
MCHC RBC AUTO-ENTMCNC: 34 G/DL (ref 33–37)
MCV RBC AUTO: 97.5 FL (ref 80–94)
MONOCYTES # BLD AUTO: 1 K/UL (ref 0.8–1)
MONOCYTES NFR BLD AUTO: 8.5 % (ref 1.7–9.3)
NEUTROPHILS # BLD AUTO: 10.4 K/UL (ref 1.8–7.7)
NEUTROPHILS NFR BLD AUTO: 85.6 % (ref 42.2–75.2)
PLATELET # BLD AUTO: 188 K/UL (ref 140–450)
POTASSIUM SERPL-SCNC: 3.6 MMOL/L (ref 3.5–5.1)
RBC # BLD AUTO: 4.5 MIL/UL (ref 4.2–5.4)
SODIUM SERPL-SCNC: 141 MMOL/L (ref 136–145)
WBC # BLD AUTO: 12.2 K/UL (ref 4.8–10.8)

## 2023-03-17 RX ADMIN — MORPHINE SULFATE PRN MG: 2 INJECTION, SOLUTION INTRAMUSCULAR; INTRAVENOUS at 18:10

## 2023-03-17 RX ADMIN — HALOPERIDOL LACTATE PRN MG: 5 INJECTION, SOLUTION INTRAMUSCULAR at 15:39

## 2023-03-17 RX ADMIN — IPRATROPIUM BROMIDE AND ALBUTEROL SULFATE SCH ML: .5; 3 SOLUTION RESPIRATORY (INHALATION) at 08:00

## 2023-03-17 RX ADMIN — MORPHINE SULFATE PRN MG: 2 INJECTION, SOLUTION INTRAMUSCULAR; INTRAVENOUS at 02:10

## 2023-03-17 RX ADMIN — IPRATROPIUM BROMIDE AND ALBUTEROL SULFATE SCH ML: .5; 3 SOLUTION RESPIRATORY (INHALATION) at 22:42

## 2023-03-17 RX ADMIN — IPRATROPIUM BROMIDE AND ALBUTEROL SULFATE SCH ML: .5; 3 SOLUTION RESPIRATORY (INHALATION) at 23:50

## 2023-03-17 RX ADMIN — WATER SCH MG: 1 INJECTION INTRAMUSCULAR; INTRAVENOUS; SUBCUTANEOUS at 08:57

## 2023-03-17 RX ADMIN — IPRATROPIUM BROMIDE AND ALBUTEROL SULFATE SCH ML: .5; 3 SOLUTION RESPIRATORY (INHALATION) at 15:52

## 2023-03-17 RX ADMIN — IPRATROPIUM BROMIDE AND ALBUTEROL SULFATE SCH ML: .5; 3 SOLUTION RESPIRATORY (INHALATION) at 11:44

## 2023-03-17 RX ADMIN — MORPHINE SULFATE PRN MG: 2 INJECTION, SOLUTION INTRAMUSCULAR; INTRAVENOUS at 12:33

## 2023-03-17 RX ADMIN — HALOPERIDOL LACTATE PRN MG: 5 INJECTION, SOLUTION INTRAMUSCULAR at 08:58

## 2023-03-17 RX ADMIN — IPRATROPIUM BROMIDE AND ALBUTEROL SULFATE SCH ML: .5; 3 SOLUTION RESPIRATORY (INHALATION) at 03:00

## 2023-03-17 RX ADMIN — ENOXAPARIN SODIUM SCH MG: 40 INJECTION SUBCUTANEOUS at 08:57

## 2023-03-17 NOTE — NUR
WAS INFORMED THAT MY PATIENT WAS FOUND ON THE GROUND. NO INJURY TO THE PATIENT. PT WAS 
ASSISTED BACK INTO BED AND BED ALARM WAS PUT ON.

## 2023-03-17 NOTE — NUR
PT COMPLAINING OF BACK PAIN 6/10. PT ASKED FOR MEDICATION. MORPHINE WAS GIVEN PER MD ORDER. 
NO OTHER COMPLAINS.

## 2023-03-17 NOTE — NUR
RECEIVED PATIENT ENDORSEMENT FROM JAMEL ACEVEDO , PATIENT NOTED IN BED AWAKE ATTEMPTING TO GET 
OUT OF THE BED. PATIENT IS EXTREMELY CONFUSED. THINKS WE ARE AT HER HOE AND TO CALL HER SON, 
NURSING ATTEMPTED REORIENTATION AND PATIENT WAS ABLE TO SETTLE DOWN.  PATIENT RESPONSE TO 
HER NAME.  NO S/S OF PAIN/DISCOMFORT. NO S/S OF RESPIRATORY DISTRESS. NOTED WAS DARK RED 
BRUISING TO BILATERAL ARMS BUT NO COMPLAINT OF PAIN WHEN TOUCHED. JAIN INTACT.  SIDE RAILS 
UP X 3 FOR SAFETY AND COMFORT. CALL LIGHT WITHIN REACH FOR MENTAL STIMULI. NURSING WILL 
FREQUENT THIS ROOM FOR ANTICIPATED ASSISTANCE. [email or web address]

## 2023-03-17 NOTE — NUR
PATIENT NOTED IN BED ASLEEP. CHEST RISING AND FALLING WITHOUT DISTRESS. NO S/S OF 
PAIN/DISCOMFORT. NURSING FREQUENTS THIS ROOM BECAUSE IS CONFUSED AND INSIST TO TRYING TO GET 
OUT OF BED. HIGH FALL RISK. [email or web address]

## 2023-03-17 NOTE — NUR
CT TECH CAME TO ASK PT IF SHE WOPULD ALLOW THEM TO DO THE CT AND PT REFUSED. PT STATED WE 
CAN DO IT ON THURSDAY.

## 2023-03-17 NOTE — NUR
CHECKED ON PT. PT IS SLEEPING COMFORTABLY IN BED. NOT IN ANY DISTRESS. WILL CONTINUE TO 
MONITOR THE PT.

## 2023-03-17 NOTE — NUR
CT TECH CALLED AND ASKED IF PT COULD GO FOR SCAN. I SAID SHE COULD. TECH CAME A NOTICED THAT 
PT MAY NOT STAY STILL IN MACHINE AT THIS TIME SO SHE TOLD ME TO CALL WHEN PT IS A LITTLE 
MORE CALM.

## 2023-03-18 VITALS — SYSTOLIC BLOOD PRESSURE: 133 MMHG | DIASTOLIC BLOOD PRESSURE: 84 MMHG

## 2023-03-18 VITALS — SYSTOLIC BLOOD PRESSURE: 118 MMHG | DIASTOLIC BLOOD PRESSURE: 72 MMHG

## 2023-03-18 VITALS — SYSTOLIC BLOOD PRESSURE: 125 MMHG | DIASTOLIC BLOOD PRESSURE: 79 MMHG

## 2023-03-18 VITALS — DIASTOLIC BLOOD PRESSURE: 76 MMHG | SYSTOLIC BLOOD PRESSURE: 144 MMHG

## 2023-03-18 VITALS — DIASTOLIC BLOOD PRESSURE: 88 MMHG | SYSTOLIC BLOOD PRESSURE: 132 MMHG

## 2023-03-18 VITALS — DIASTOLIC BLOOD PRESSURE: 75 MMHG | SYSTOLIC BLOOD PRESSURE: 124 MMHG

## 2023-03-18 RX ADMIN — IPRATROPIUM BROMIDE AND ALBUTEROL SULFATE SCH ML: .5; 3 SOLUTION RESPIRATORY (INHALATION) at 07:44

## 2023-03-18 RX ADMIN — ENOXAPARIN SODIUM SCH MG: 40 INJECTION SUBCUTANEOUS at 08:46

## 2023-03-18 RX ADMIN — WATER SCH MG: 1 INJECTION INTRAMUSCULAR; INTRAVENOUS; SUBCUTANEOUS at 08:47

## 2023-03-18 RX ADMIN — ZOLPIDEM TARTRATE PRN MG: 10 TABLET, FILM COATED ORAL at 23:24

## 2023-03-18 RX ADMIN — IPRATROPIUM BROMIDE AND ALBUTEROL SULFATE SCH ML: .5; 3 SOLUTION RESPIRATORY (INHALATION) at 11:00

## 2023-03-18 RX ADMIN — IPRATROPIUM BROMIDE AND ALBUTEROL SULFATE SCH ML: .5; 3 SOLUTION RESPIRATORY (INHALATION) at 23:00

## 2023-03-18 RX ADMIN — IPRATROPIUM BROMIDE AND ALBUTEROL SULFATE SCH ML: .5; 3 SOLUTION RESPIRATORY (INHALATION) at 03:09

## 2023-03-18 RX ADMIN — HALOPERIDOL LACTATE PRN MG: 5 INJECTION, SOLUTION INTRAMUSCULAR at 07:52

## 2023-03-18 RX ADMIN — IPRATROPIUM BROMIDE AND ALBUTEROL SULFATE SCH ML: .5; 3 SOLUTION RESPIRATORY (INHALATION) at 19:00

## 2023-03-18 RX ADMIN — IPRATROPIUM BROMIDE AND ALBUTEROL SULFATE SCH ML: .5; 3 SOLUTION RESPIRATORY (INHALATION) at 15:00

## 2023-03-18 NOTE — NUR
PATIENT NOTED IN BED ASLEEP. CHEST RISING AND FALLING WITHOUT DISTRESS. NO S/S OF 
PAIN/DISCOMFORT. NURSING FREQUENTS THIS ROOM FOR SAFETY AND ANTICIPATED ASSISTANCE. HIGH 
FALL RISK. [email or web address]

## 2023-03-18 NOTE — NUR
received patient from night shift nurse. patient confused and trying to get out of bed. MD 
aware of situation.all safety measures in place.Fall precautions initiated. bed in low 
position. call light within reach. vs stable, will continue to monitor.

## 2023-03-18 NOTE — NUR
PATIENT WAS REDIRECTED TO REMAIN IN THE BED TO STAY SAFE. PATIENT REMAINS IN BED AT THIS 
TIME. NURSING WILL FREQUENT THE ROOM IN ANTICIPATION TO NEEDS. TO PROVIDE SAFE ENVIRONMENT 
FOR SAFE PATIENT CARE. [email or web address]

## 2023-03-18 NOTE — NUR
FREQUENT ROUNDS DONE. PATIENT IS STILL CONFUSED BUT ALERT TIMES 2. ON 2L NC. MORNING CARE 
GIVEN. ALL BEATRIS MEDS GIVEN. VS STABLE. NO SIGNS OF DISTRESS NOTED. ALL NEEDS MET UNTIL THIS 
TIME.

## 2023-03-18 NOTE — NUR
PATIENT WAS WASHED AND KEPT CLEAN. THE ORAL CARE WAS GIVEN BY NURSING. URINE NOTED DARK 
YELLOWISH BROWN. NO BOWEL MOVEMENT FOR THE SHIFT. SIDE RAILS UP. CALL LIGHT WITHIN REACH. 
NURSING FREQUENT THIS ROOM FOR ANTICIPATED NEEDS. [email or web address]

## 2023-03-19 VITALS — DIASTOLIC BLOOD PRESSURE: 70 MMHG | SYSTOLIC BLOOD PRESSURE: 144 MMHG

## 2023-03-19 VITALS — SYSTOLIC BLOOD PRESSURE: 133 MMHG | DIASTOLIC BLOOD PRESSURE: 84 MMHG

## 2023-03-19 VITALS — SYSTOLIC BLOOD PRESSURE: 135 MMHG | DIASTOLIC BLOOD PRESSURE: 85 MMHG

## 2023-03-19 VITALS — DIASTOLIC BLOOD PRESSURE: 80 MMHG | SYSTOLIC BLOOD PRESSURE: 119 MMHG

## 2023-03-19 VITALS — SYSTOLIC BLOOD PRESSURE: 156 MMHG | DIASTOLIC BLOOD PRESSURE: 85 MMHG

## 2023-03-19 RX ADMIN — IPRATROPIUM BROMIDE AND ALBUTEROL SULFATE SCH ML: .5; 3 SOLUTION RESPIRATORY (INHALATION) at 20:18

## 2023-03-19 RX ADMIN — ZOLPIDEM TARTRATE PRN MG: 10 TABLET, FILM COATED ORAL at 20:49

## 2023-03-19 RX ADMIN — ENOXAPARIN SODIUM SCH MG: 40 INJECTION SUBCUTANEOUS at 10:16

## 2023-03-19 RX ADMIN — ACETAMINOPHEN PRN MG: 325 TABLET ORAL at 15:10

## 2023-03-19 RX ADMIN — IPRATROPIUM BROMIDE AND ALBUTEROL SULFATE SCH ML: .5; 3 SOLUTION RESPIRATORY (INHALATION) at 11:37

## 2023-03-19 RX ADMIN — ACETAMINOPHEN PRN MG: 325 TABLET ORAL at 08:24

## 2023-03-19 RX ADMIN — IPRATROPIUM BROMIDE AND ALBUTEROL SULFATE SCH ML: .5; 3 SOLUTION RESPIRATORY (INHALATION) at 23:00

## 2023-03-19 RX ADMIN — IPRATROPIUM BROMIDE AND ALBUTEROL SULFATE SCH ML: .5; 3 SOLUTION RESPIRATORY (INHALATION) at 03:00

## 2023-03-19 RX ADMIN — WATER SCH MG: 1 INJECTION INTRAMUSCULAR; INTRAVENOUS; SUBCUTANEOUS at 08:24

## 2023-03-19 RX ADMIN — MORPHINE SULFATE PRN MG: 2 INJECTION, SOLUTION INTRAMUSCULAR; INTRAVENOUS at 22:39

## 2023-03-19 RX ADMIN — IPRATROPIUM BROMIDE AND ALBUTEROL SULFATE SCH ML: .5; 3 SOLUTION RESPIRATORY (INHALATION) at 15:13

## 2023-03-19 NOTE — NUR
rn notes



patient remains on room air at this time, no sob noted, VSS all shift.  1;1 sitter at all 
time, given PRN 1 time ativan.

## 2023-03-19 NOTE — NUR
ENDORSED PT TO NIGHT SHIFT RN FOR CONTINUITY OF CARE. PT IS STABLE AND SITTING UP IN BED. ON 
2L NC. CENTRAL LINE CLEAN AND INTACT. NO SIGNS OF DISTRESS.



BED IN LOWEST POSITION, 4 SIDE RAILS UP, CALL LIGHT PLACED WITHIN REACH.

## 2023-03-19 NOTE — NUR
RECEIVED REPORT FROM NIGHTSHIFT NURSE.



PT IS AWAKE, RESTING IN BED. PT IS PRIMARILY Frisian SPEAKING, AOX2, ON 2L NC, NO SIGNS OF 
DISTRESS. NIGHTSHIFT NURSE REPORTS PT ATTEMPTS TO PULL AT IV'S AND JAIN, PT HAS 1:1 SITTER.



PT HAS LEFT SUBCLAVIAN TRIPLE LUMEN CENTRAL LINE, SKIN INTACT. 



BED IN LOWEST POSITION, SIDE RAILS UP, CALL LIGHT WITHIN REACH.

## 2023-03-19 NOTE — NUR
PT COMPLAINT OF BACK PAIN ON A SCALE OF 6/10. REPOSITIONED THE PT BUT STILL COMPLAINS OF 
MODERATE PAIN. POC WAS DISCUSSED WITH JENNIFER ACEVEDO. MORPHINE 2MG PRN FOR MODERATE PAIN LEVEL 
4-6 WAS ADMINISTERED BY DEMETRICE ACEVEDO, WILL REASSESS PT IN ONE HOUR.

## 2023-03-19 NOTE — NUR
RECEIVED REPORT FROM DAY SHIFT NURSE FOR CONTINUITY OF CARE. PT IS AWAKE IN BED ALERT AND 
ORIENTED X3 WITH PERIODS OF CONFUSION. PT IS PRIMARILY Guinean SPEAKING BUT DOES UNDERSTAND 
ENGLISH. PT CURRENTLY ON 2L NASAL CANULA WITH NO ACUTE SIGNS OF DISTRESS NOTED. JAIN 
CATHETER IN PLACE AND INTACT. PT IS TO BE UNDER 1.1 FOR FALL PRECAUTIONS, ATTEMPTING TO 
REMOVE JAIN, AND ASPIRATION PRECAUTIONS. SAFETY MEASURES IN PLACE, WILL CLOSELY MONITOR 
THROUGHOUT SHIFT.

## 2023-03-19 NOTE — NUR
PT STATED HAVING DIFFICULTY WITH SLEEPING. AMBIEN 10 MG PRN WAS ADMINISTERED. NO SIGNS OF 
DISTRESS NOTED, WILL CONTINUE TO MONITOR. 

-------------------------------------------------------------------------------

Addendum: 03/20/23 at 0448 by Antoine Ruiz, LVN LVN

-------------------------------------------------------------------------------

PT INITIALLY REFUSED AMBIEN IN CRUSHED FORM WITH APPLE SAUCE, STATED SHE WANTED TO TAKE IT 
AS A PILL FORM. MEDICATION WAS WASTED AND RETAKEN FROM THE PIXUS. CUT MEDICATION IN 4TH'S TO 
ENSURE PROPER ASPIRATION PRECAUTIONS WERE MET.

## 2023-03-20 VITALS — SYSTOLIC BLOOD PRESSURE: 120 MMHG | DIASTOLIC BLOOD PRESSURE: 73 MMHG

## 2023-03-20 VITALS — SYSTOLIC BLOOD PRESSURE: 132 MMHG | DIASTOLIC BLOOD PRESSURE: 79 MMHG

## 2023-03-20 VITALS — DIASTOLIC BLOOD PRESSURE: 84 MMHG | SYSTOLIC BLOOD PRESSURE: 134 MMHG

## 2023-03-20 VITALS — DIASTOLIC BLOOD PRESSURE: 99 MMHG | SYSTOLIC BLOOD PRESSURE: 149 MMHG

## 2023-03-20 VITALS — DIASTOLIC BLOOD PRESSURE: 96 MMHG | SYSTOLIC BLOOD PRESSURE: 149 MMHG

## 2023-03-20 VITALS — SYSTOLIC BLOOD PRESSURE: 133 MMHG | DIASTOLIC BLOOD PRESSURE: 84 MMHG

## 2023-03-20 LAB
ANION GAP SERPL CALCULATED.3IONS-SCNC: 12.1 MMOL/L (ref 8–16)
BASOPHILS # BLD AUTO: 0.1 K/UL (ref 0–0.22)
BASOPHILS NFR BLD AUTO: 0.4 % (ref 0–2)
BUN SERPL-MCNC: 21 MG/DL (ref 7–18)
CHLORIDE SERPL-SCNC: 106 MMOL/L (ref 98–107)
CO2 SERPL-SCNC: 27.3 MMOL/L (ref 21–32)
CREAT SERPL-MCNC: 0.6 MG/DL (ref 0.6–1.3)
EOSINOPHIL # BLD AUTO: 0.2 K/UL (ref 0–0.4)
EOSINOPHIL NFR BLD AUTO: 1 % (ref 0–4)
ERYTHROCYTE [DISTWIDTH] IN BLOOD BY AUTOMATED COUNT: 14.8 % (ref 11.6–13.7)
GFR SERPL CREATININE-BSD FRML MDRD: 133 ML/MIN (ref 90–?)
GLUCOSE SERPL-MCNC: 130 MG/DL (ref 74–106)
HCT VFR BLD AUTO: 43.4 % (ref 36–48)
HGB BLD-MCNC: 14.8 G/DL (ref 12–16)
LYMPHOCYTES # BLD AUTO: 1 K/UL (ref 2.5–16.5)
LYMPHOCYTES NFR BLD AUTO: 6.1 % (ref 20.5–51.1)
MCH RBC QN AUTO: 33 PG (ref 27–31)
MCHC RBC AUTO-ENTMCNC: 34 G/DL (ref 33–37)
MCV RBC AUTO: 97.9 FL (ref 80–94)
MONOCYTES # BLD AUTO: 1 K/UL (ref 0.8–1)
MONOCYTES NFR BLD AUTO: 5.8 % (ref 1.7–9.3)
NEUTROPHILS # BLD AUTO: 14.5 K/UL (ref 1.8–7.7)
NEUTROPHILS NFR BLD AUTO: 86.7 % (ref 42.2–75.2)
PLATELET # BLD AUTO: 235 K/UL (ref 140–450)
POTASSIUM SERPL-SCNC: 3.4 MMOL/L (ref 3.5–5.1)
RBC # BLD AUTO: 4.44 MIL/UL (ref 4.2–5.4)
SODIUM SERPL-SCNC: 142 MMOL/L (ref 136–145)
WBC # BLD AUTO: 16.7 K/UL (ref 4.8–10.8)

## 2023-03-20 RX ADMIN — WATER SCH MG: 1 INJECTION INTRAMUSCULAR; INTRAVENOUS; SUBCUTANEOUS at 09:37

## 2023-03-20 RX ADMIN — IPRATROPIUM BROMIDE AND ALBUTEROL SULFATE SCH ML: .5; 3 SOLUTION RESPIRATORY (INHALATION) at 11:00

## 2023-03-20 RX ADMIN — ACETAMINOPHEN PRN MG: 325 TABLET ORAL at 14:45

## 2023-03-20 RX ADMIN — ENOXAPARIN SODIUM SCH MG: 40 INJECTION SUBCUTANEOUS at 10:02

## 2023-03-20 RX ADMIN — IPRATROPIUM BROMIDE AND ALBUTEROL SULFATE SCH ML: .5; 3 SOLUTION RESPIRATORY (INHALATION) at 23:00

## 2023-03-20 RX ADMIN — IPRATROPIUM BROMIDE AND ALBUTEROL SULFATE SCH ML: .5; 3 SOLUTION RESPIRATORY (INHALATION) at 07:00

## 2023-03-20 RX ADMIN — IPRATROPIUM BROMIDE AND ALBUTEROL SULFATE SCH ML: .5; 3 SOLUTION RESPIRATORY (INHALATION) at 15:00

## 2023-03-20 RX ADMIN — ZOLPIDEM TARTRATE PRN MG: 10 TABLET, FILM COATED ORAL at 22:35

## 2023-03-20 RX ADMIN — GUAIFENESIN SCH MG: 600 TABLET, EXTENDED RELEASE ORAL at 11:14

## 2023-03-20 RX ADMIN — HALOPERIDOL LACTATE PRN MG: 5 INJECTION, SOLUTION INTRAMUSCULAR at 10:24

## 2023-03-20 RX ADMIN — IPRATROPIUM BROMIDE AND ALBUTEROL SULFATE SCH ML: .5; 3 SOLUTION RESPIRATORY (INHALATION) at 19:00

## 2023-03-20 RX ADMIN — GUAIFENESIN SCH MG: 600 TABLET, EXTENDED RELEASE ORAL at 21:00

## 2023-03-20 RX ADMIN — IPRATROPIUM BROMIDE AND ALBUTEROL SULFATE SCH ML: .5; 3 SOLUTION RESPIRATORY (INHALATION) at 03:00

## 2023-03-20 NOTE — NUR
PT REFUSED BREATHING TX. NO DISTRESS NOTED. VITALS WERE CHECKED WHILE PT WAS ON ROOM AIR. 
SAT 92% . WILL CONTINUE TO MONITOR.

## 2023-03-20 NOTE — NUR
PT ASLEEP IN BED. NOTICEABLE CHEST RISE AND FALL, BREATHING LABORED AND UNEVEN WITH AN 
IRREGULAR PATTERN. STILL ON 2L NC SATING AT 93%. NO S/SX OF ACUTE DISTRESS NOTED, WILL 
CONTINUE CLOSELY MONITORING.

## 2023-03-20 NOTE — NUR
PT INITIALLY REFUSED HER MUCINEX AT 2100. STATED SHE NOW WANTS IT ADMINISTERED. PT ALSO 
REQUESTED AMBIEN TO HELP HER SLEEP. BOTH WERE ADMINISTERED WITH NO COMPLICATIONS. PT STABLE 
AT THIS TIME, WILL CONTINUE TO MONITOR.

## 2023-03-20 NOTE — NUR
3/20/23 RD FOLLOW UP COMPLETED.PLEASE REFER TO NUTRITION ASSESSMENT UNDER CARE ACTIVITY FOR 
ESTIMATED NUTRITIONAL NEEDS. 



1. CONTINUE WITH CADIAC DIET (PUREE, HONEY THICK LIQUIDS) PER SPEECH PATH.

2. MONITOR NUTRITION-RELATED LAB VALUES.

3. RD TO FOLLOW-UP IN 3-5 DAYS PATIENT IS MODERATE RISK.

FATIMAH SHOEMAKER RD

## 2023-03-20 NOTE — NUR
0400 VS TAKEN. /73, HR 93, O2 96%, RR 22, TEMP 98.4. NO SIGNS OF DISTRESS NOTED, WILL 
CONTINUE 1:1 MONITORING.

## 2023-03-20 NOTE — NUR
UPON REASSESSMENT, PAIN LEVEL OF 0-10. BP /96, AND HR . PT IS STABLE AT THIS 
TIME, WILL CONTINUE TO MONITOR.

## 2023-03-20 NOTE — NUR
PATIENT  VISITING. PATIENT BECAME AGGRAVATED WHEN  SAID HE'S LEAVING FOR WORK. 
PT STATES "I AM LEAVING TOO, I WILL TAKE OUT ALL THESE CORDS. DONT LEAVE ME" PT ATTEMPTS TO 
GET OUT OF BED. PATIENT REDIRECTED BY NURSE. PT NOW CALM ON BED RESTING.

## 2023-03-20 NOTE — NUR
PT SLEPT THROUGHOUT THE NIGHT WITH NO COMPLICATIONS. 300 ML WAS DRAINED FROM JAIN. PT HAD 
NO BM LAST NIGHT. WILL ENDORSE TO DAY SHIFT NURSE IN STABLE CONDITION.

## 2023-03-20 NOTE — NUR
RECEIVED REPORT FROM DAY SHIFT NURSE FOR CONTINUITY OF CARE. PT IS AWAKE IN BED ALERT AND 
ORIENTED X4 WITH PERIODS OF CONFUSION. PT IS PRIMARILY [place] citizen SPEAKING BUT DOES UNDERSTAND 
ENGLISH. PT CURRENTLY ON 2L NASAL CANULA WITH NO ACUTE SIGNS OF DISTRESS NOTED. JAIN 
CATHETER IN PLACE AND INTACT. PT IS TO BE UNDER 1.1 FOR FALL PRECAUTIONS, ATTEMPTING TO 
REMOVE JAIN, AND ASPIRATION PRECAUTIONS. SAFETY MEASURES IN PLACE, WILL CLOSELY MONITOR 
THROUGHOUT SHIFT.

## 2023-03-20 NOTE — NUR
RECEIVED PT FROM NIGHT SHIFT NURSE FOR CONTINUITY OF CARE. PT IN BED SLEEPING. VISIBLE CHEST 
RISE AND FALL. PT LIFTS HEAD AND SAYS "I NEED TO SLEEP". ROLLS OVER FROM LEFT TO RIGHT ON 
BED. SKIN WARM AND DRY. JAIN IN PLACE AND DRAINING TO GRAVITY. ALL SAFETY PRECAUTIONS IN 
PLACE. CARE ONGOING, MONITORING 1:1.

## 2023-03-21 VITALS — SYSTOLIC BLOOD PRESSURE: 142 MMHG | DIASTOLIC BLOOD PRESSURE: 84 MMHG

## 2023-03-21 VITALS — SYSTOLIC BLOOD PRESSURE: 146 MMHG | DIASTOLIC BLOOD PRESSURE: 70 MMHG

## 2023-03-21 VITALS — SYSTOLIC BLOOD PRESSURE: 146 MMHG | DIASTOLIC BLOOD PRESSURE: 67 MMHG

## 2023-03-21 VITALS — DIASTOLIC BLOOD PRESSURE: 72 MMHG | SYSTOLIC BLOOD PRESSURE: 139 MMHG

## 2023-03-21 VITALS — SYSTOLIC BLOOD PRESSURE: 140 MMHG | DIASTOLIC BLOOD PRESSURE: 67 MMHG

## 2023-03-21 VITALS — SYSTOLIC BLOOD PRESSURE: 121 MMHG | DIASTOLIC BLOOD PRESSURE: 74 MMHG

## 2023-03-21 RX ADMIN — IPRATROPIUM BROMIDE AND ALBUTEROL SULFATE SCH ML: .5; 3 SOLUTION RESPIRATORY (INHALATION) at 03:19

## 2023-03-21 RX ADMIN — WATER SCH MG: 1 INJECTION INTRAMUSCULAR; INTRAVENOUS; SUBCUTANEOUS at 09:43

## 2023-03-21 RX ADMIN — IPRATROPIUM BROMIDE AND ALBUTEROL SULFATE SCH ML: .5; 3 SOLUTION RESPIRATORY (INHALATION) at 10:44

## 2023-03-21 RX ADMIN — ENOXAPARIN SODIUM SCH MG: 40 INJECTION SUBCUTANEOUS at 09:50

## 2023-03-21 RX ADMIN — IPRATROPIUM BROMIDE AND ALBUTEROL SULFATE SCH ML: .5; 3 SOLUTION RESPIRATORY (INHALATION) at 23:05

## 2023-03-21 RX ADMIN — IPRATROPIUM BROMIDE AND ALBUTEROL SULFATE SCH ML: .5; 3 SOLUTION RESPIRATORY (INHALATION) at 16:28

## 2023-03-21 RX ADMIN — ACETAMINOPHEN PRN MG: 325 TABLET ORAL at 06:26

## 2023-03-21 RX ADMIN — IPRATROPIUM BROMIDE AND ALBUTEROL SULFATE SCH ML: .5; 3 SOLUTION RESPIRATORY (INHALATION) at 15:16

## 2023-03-21 RX ADMIN — POTASSIUM CHLORIDE PRN MLS/HR: 200 INJECTION, SOLUTION INTRAVENOUS at 17:58

## 2023-03-21 RX ADMIN — IPRATROPIUM BROMIDE AND ALBUTEROL SULFATE SCH ML: .5; 3 SOLUTION RESPIRATORY (INHALATION) at 19:44

## 2023-03-21 RX ADMIN — ZOLPIDEM TARTRATE PRN MG: 10 TABLET, FILM COATED ORAL at 23:05

## 2023-03-21 RX ADMIN — GUAIFENESIN SCH MG: 600 TABLET, EXTENDED RELEASE ORAL at 20:34

## 2023-03-21 RX ADMIN — GUAIFENESIN SCH MG: 600 TABLET, EXTENDED RELEASE ORAL at 09:46

## 2023-03-21 NOTE — NUR
PT ASLEEP, CHEST RISE AND FALL NOTED, RESPIRATIONS UNEVEN AND LABORED. O2 SATURATION AT 94%, 
NO ACUTE DISTRESS NOTED, WILL CONTINUE TO MONITOR.

## 2023-03-21 NOTE — NUR
ADMINISTERED TYLENOL PRN FOR COMPLAINTS OF HEADACHE. PT TOLERATED WELL, WILL ENDORSE TO DAY 
SHIFT NURSE FOR CONTINUITY OF CARE.

## 2023-03-21 NOTE — NUR
DC PLANNING:

PER PHYSICAL THERAPY RECOMMENDATION FAXED TO [facility]  AND CONTRACTED FACILITY. CM SPOKE WITH 
PT'S  STATED NO ONE IS HELPING PATIENT AT HOME AND REQUESTING TO GO TO SNF. FAXED TO 
[facility]. DC PLAN AWAITING FOR ACCEPTING FACILITY. CM TO FOLLOW 

-------------------------------------------------------------------------------

Addendum: 03/22/23 at 1610 by GRECIA DAVIS CM

-------------------------------------------------------------------------------

FAXED TO [facility]. SPOKE WITH MELITA AT [facility] [phone] LOCATED AT 
[address] [address] AND PT WAS ACCEPTED. PT WILL BE GOING TO ROOM 6-B UNDER 
THE CARE OF DR GRAY. PT WILL BE LEAVING TOMORROW WHEN SHE IS STABLE FOR TRANSPORT.

## 2023-03-22 VITALS — DIASTOLIC BLOOD PRESSURE: 78 MMHG | SYSTOLIC BLOOD PRESSURE: 133 MMHG

## 2023-03-22 VITALS — DIASTOLIC BLOOD PRESSURE: 81 MMHG | SYSTOLIC BLOOD PRESSURE: 135 MMHG

## 2023-03-22 VITALS — SYSTOLIC BLOOD PRESSURE: 137 MMHG | DIASTOLIC BLOOD PRESSURE: 81 MMHG

## 2023-03-22 VITALS — DIASTOLIC BLOOD PRESSURE: 81 MMHG | SYSTOLIC BLOOD PRESSURE: 134 MMHG

## 2023-03-22 VITALS — DIASTOLIC BLOOD PRESSURE: 79 MMHG | SYSTOLIC BLOOD PRESSURE: 138 MMHG

## 2023-03-22 VITALS — SYSTOLIC BLOOD PRESSURE: 137 MMHG | DIASTOLIC BLOOD PRESSURE: 86 MMHG

## 2023-03-22 LAB
ANION GAP SERPL CALCULATED.3IONS-SCNC: 12.7 MMOL/L (ref 8–16)
BASOPHILS # BLD AUTO: 0 K/UL (ref 0–0.22)
BASOPHILS NFR BLD AUTO: 0.1 % (ref 0–2)
BUN SERPL-MCNC: 17 MG/DL (ref 7–18)
CHLORIDE SERPL-SCNC: 100 MMOL/L (ref 98–107)
CO2 SERPL-SCNC: 26.1 MMOL/L (ref 21–32)
CREAT SERPL-MCNC: 0.8 MG/DL (ref 0.6–1.3)
EOSINOPHIL # BLD AUTO: 0.1 K/UL (ref 0–0.4)
EOSINOPHIL NFR BLD AUTO: 0.4 % (ref 0–4)
ERYTHROCYTE [DISTWIDTH] IN BLOOD BY AUTOMATED COUNT: 14.7 % (ref 11.6–13.7)
GFR SERPL CREATININE-BSD FRML MDRD: 95 ML/MIN (ref 90–?)
GLUCOSE SERPL-MCNC: 136 MG/DL (ref 74–106)
HCT VFR BLD AUTO: 43.4 % (ref 36–48)
HGB BLD-MCNC: 14.5 G/DL (ref 12–16)
LYMPHOCYTES # BLD AUTO: 0.9 K/UL (ref 2.5–16.5)
LYMPHOCYTES NFR BLD AUTO: 2.4 % (ref 20.5–51.1)
MCH RBC QN AUTO: 33 PG (ref 27–31)
MCHC RBC AUTO-ENTMCNC: 33 G/DL (ref 33–37)
MCV RBC AUTO: 98.3 FL (ref 80–94)
MONOCYTES # BLD AUTO: 1.1 K/UL (ref 0.8–1)
MONOCYTES NFR BLD AUTO: 3.2 % (ref 1.7–9.3)
NEUTROPHILS # BLD AUTO: 33.4 K/UL (ref 1.8–7.7)
NEUTROPHILS NFR BLD AUTO: 93.9 % (ref 42.2–75.2)
PLATELET # BLD AUTO: 272 K/UL (ref 140–450)
POTASSIUM SERPL-SCNC: 3.8 MMOL/L (ref 3.5–5.1)
RBC # BLD AUTO: 4.41 MIL/UL (ref 4.2–5.4)
SODIUM SERPL-SCNC: 135 MMOL/L (ref 136–145)
WBC # BLD AUTO: 35.6 K/UL (ref 4.8–10.8)

## 2023-03-22 RX ADMIN — IPRATROPIUM BROMIDE AND ALBUTEROL SULFATE SCH ML: .5; 3 SOLUTION RESPIRATORY (INHALATION) at 23:00

## 2023-03-22 RX ADMIN — ACETAMINOPHEN PRN MG: 325 TABLET ORAL at 20:57

## 2023-03-22 RX ADMIN — WATER SCH MG: 1 INJECTION INTRAMUSCULAR; INTRAVENOUS; SUBCUTANEOUS at 08:39

## 2023-03-22 RX ADMIN — ACETAMINOPHEN PRN MG: 325 TABLET ORAL at 04:37

## 2023-03-22 RX ADMIN — IPRATROPIUM BROMIDE AND ALBUTEROL SULFATE SCH ML: .5; 3 SOLUTION RESPIRATORY (INHALATION) at 13:11

## 2023-03-22 RX ADMIN — GUAIFENESIN SCH MG: 600 TABLET, EXTENDED RELEASE ORAL at 08:48

## 2023-03-22 RX ADMIN — IPRATROPIUM BROMIDE AND ALBUTEROL SULFATE SCH ML: .5; 3 SOLUTION RESPIRATORY (INHALATION) at 20:06

## 2023-03-22 RX ADMIN — ENOXAPARIN SODIUM SCH MG: 40 INJECTION SUBCUTANEOUS at 08:40

## 2023-03-22 RX ADMIN — IPRATROPIUM BROMIDE AND ALBUTEROL SULFATE SCH ML: .5; 3 SOLUTION RESPIRATORY (INHALATION) at 16:15

## 2023-03-22 RX ADMIN — IPRATROPIUM BROMIDE AND ALBUTEROL SULFATE SCH ML: .5; 3 SOLUTION RESPIRATORY (INHALATION) at 08:46

## 2023-03-22 RX ADMIN — ZOLPIDEM TARTRATE PRN MG: 10 TABLET, FILM COATED ORAL at 20:58

## 2023-03-22 RX ADMIN — GUAIFENESIN SCH MG: 600 TABLET, EXTENDED RELEASE ORAL at 20:46

## 2023-03-22 NOTE — NUR
PATIENT AMBULATORY WITH ASSISTANCE. AOX4. REQUESTS TO GO HOME BUT  STATES NO ONE CAN 
TAKE CARE OF HER.

## 2023-03-23 VITALS — DIASTOLIC BLOOD PRESSURE: 75 MMHG | SYSTOLIC BLOOD PRESSURE: 118 MMHG

## 2023-03-23 VITALS — SYSTOLIC BLOOD PRESSURE: 130 MMHG | DIASTOLIC BLOOD PRESSURE: 76 MMHG

## 2023-03-23 VITALS — SYSTOLIC BLOOD PRESSURE: 140 MMHG | DIASTOLIC BLOOD PRESSURE: 82 MMHG

## 2023-03-23 VITALS — SYSTOLIC BLOOD PRESSURE: 118 MMHG | DIASTOLIC BLOOD PRESSURE: 77 MMHG

## 2023-03-23 VITALS — SYSTOLIC BLOOD PRESSURE: 119 MMHG | DIASTOLIC BLOOD PRESSURE: 83 MMHG

## 2023-03-23 VITALS — DIASTOLIC BLOOD PRESSURE: 75 MMHG | SYSTOLIC BLOOD PRESSURE: 106 MMHG

## 2023-03-23 LAB
ANION GAP SERPL CALCULATED.3IONS-SCNC: 19 MMOL/L (ref 8–16)
ANION GAP SERPL CALCULATED.3IONS-SCNC: 19.8 MMOL/L (ref 8–16)
BUN SERPL-MCNC: 47 MG/DL (ref 7–18)
BUN SERPL-MCNC: 51 MG/DL (ref 7–18)
CHLORIDE SERPL-SCNC: 91 MMOL/L (ref 98–107)
CHLORIDE SERPL-SCNC: 93 MMOL/L (ref 98–107)
CO2 SERPL-SCNC: 21.2 MMOL/L (ref 21–32)
CO2 SERPL-SCNC: 22.1 MMOL/L (ref 21–32)
CREAT SERPL-MCNC: 2.1 MG/DL (ref 0.6–1.3)
CREAT SERPL-MCNC: 2.5 MG/DL (ref 0.6–1.3)
EOSINOPHIL NFR BLD MANUAL: 1 % (ref 0–4)
ERYTHROCYTE [DISTWIDTH] IN BLOOD BY AUTOMATED COUNT: 15.6 % (ref 11.6–13.7)
GFR SERPL CREATININE-BSD FRML MDRD: 26 ML/MIN (ref 90–?)
GFR SERPL CREATININE-BSD FRML MDRD: 31 ML/MIN (ref 90–?)
GLUCOSE SERPL-MCNC: 212 MG/DL (ref 74–106)
GLUCOSE SERPL-MCNC: 214 MG/DL (ref 74–106)
HCT VFR BLD AUTO: 49.6 % (ref 36–48)
HGB BLD-MCNC: 16.8 G/DL (ref 12–16)
LYMPHOCYTES NFR BLD MANUAL: 3 % (ref 20–46)
MCH RBC QN AUTO: 33 PG (ref 27–31)
MCHC RBC AUTO-ENTMCNC: 34 G/DL (ref 33–37)
MCV RBC AUTO: 98.6 FL (ref 80–94)
MONOCYTES NFR BLD MANUAL: 5 % (ref 5–12)
PLATELET # BLD AUTO: 280 K/UL (ref 140–450)
POTASSIUM SERPL-SCNC: 5.2 MMOL/L (ref 3.5–5.1)
POTASSIUM SERPL-SCNC: 5.9 MMOL/L (ref 3.5–5.1)
RBC # BLD AUTO: 5.03 MIL/UL (ref 4.2–5.4)
SODIUM SERPL-SCNC: 127 MMOL/L (ref 136–145)
SODIUM SERPL-SCNC: 128 MMOL/L (ref 136–145)
WBC # BLD AUTO: 28.8 K/UL (ref 4.8–10.8)

## 2023-03-23 RX ADMIN — IPRATROPIUM BROMIDE AND ALBUTEROL SULFATE SCH ML: .5; 3 SOLUTION RESPIRATORY (INHALATION) at 14:54

## 2023-03-23 RX ADMIN — GUAIFENESIN SCH MG: 600 TABLET, EXTENDED RELEASE ORAL at 08:27

## 2023-03-23 RX ADMIN — MORPHINE SULFATE PRN MG: 2 INJECTION, SOLUTION INTRAMUSCULAR; INTRAVENOUS at 09:11

## 2023-03-23 RX ADMIN — MORPHINE SULFATE PRN MG: 2 INJECTION, SOLUTION INTRAMUSCULAR; INTRAVENOUS at 15:05

## 2023-03-23 RX ADMIN — IPRATROPIUM BROMIDE AND ALBUTEROL SULFATE SCH ML: .5; 3 SOLUTION RESPIRATORY (INHALATION) at 19:00

## 2023-03-23 RX ADMIN — IPRATROPIUM BROMIDE AND ALBUTEROL SULFATE SCH ML: .5; 3 SOLUTION RESPIRATORY (INHALATION) at 22:43

## 2023-03-23 RX ADMIN — IPRATROPIUM BROMIDE AND ALBUTEROL SULFATE SCH ML: .5; 3 SOLUTION RESPIRATORY (INHALATION) at 11:10

## 2023-03-23 RX ADMIN — ACETAMINOPHEN PRN MG: 325 TABLET ORAL at 06:45

## 2023-03-23 RX ADMIN — IPRATROPIUM BROMIDE AND ALBUTEROL SULFATE SCH ML: .5; 3 SOLUTION RESPIRATORY (INHALATION) at 03:00

## 2023-03-23 RX ADMIN — IPRATROPIUM BROMIDE AND ALBUTEROL SULFATE SCH ML: .5; 3 SOLUTION RESPIRATORY (INHALATION) at 07:57

## 2023-03-23 RX ADMIN — ENOXAPARIN SODIUM SCH MG: 40 INJECTION SUBCUTANEOUS at 08:28

## 2023-03-23 RX ADMIN — GUAIFENESIN SCH MG: 600 TABLET, EXTENDED RELEASE ORAL at 21:00

## 2023-03-23 RX ADMIN — DEXTROSE AND SODIUM CHLORIDE SCH MLS/HR: 5; .9 INJECTION, SOLUTION INTRAVENOUS at 22:17

## 2023-03-23 RX ADMIN — IPRATROPIUM BROMIDE AND ALBUTEROL SULFATE SCH ML: .5; 3 SOLUTION RESPIRATORY (INHALATION) at 23:58

## 2023-03-23 RX ADMIN — WATER SCH MG: 1 INJECTION INTRAMUSCULAR; INTRAVENOUS; SUBCUTANEOUS at 08:28

## 2023-03-23 NOTE — NUR
INFORM DR. CEJA PT REFUSED INSERTION OF  JAIN CATH , AND LOKELMA - WILL WAIT HER RESPONSE 
.

-------------------------------------------------------------------------------

Addendum: 03/23/23 at 2112 by Keri Gill RN

-------------------------------------------------------------------------------

PT IS ON PUREWICK , ON CANISTER CONNECTING TO PUREWICK IS FULL , CLEAR IN APPEARANCE  . WILL 
CONT. TO MONITOR 

-------------------------------------------------------------------------------

Addendum: 03/24/23 at 0255 by Keri Gill RN

-------------------------------------------------------------------------------

 at 2112  -informed both cat and daniela pt refused  kidneys us

## 2023-03-23 NOTE — NUR
***** PHYSICAL THERAPY CO-SIGN *****

The Physical Therapy Progress Notes documented by Physical Therapy Assistant have been 
reviewed.



Reviewed/Co-Signed by: Caren Martin PT

Documentation Done by:HARESH WEBB PTA

-------------------------------------------------------------------------------

Addendum: 03/24/23 at 1530 by Caren Martin PT

-------------------------------------------------------------------------------

Amended: Links added.

## 2023-03-23 NOTE — NUR
ASKED PT IF SHE WOULD LIKE HER BREATHING TREATMENT. PATIENT SAID NOT AT THIS TIME BECAUSE 
HER STOMACH WAS UPSET. NOTIFIED NURSE. BEATH SOUNDS WERE CLEAR AND SATURATION WAS 98%. WILL 
CONTINUE TO  MONITOR.

## 2023-03-23 NOTE — NUR
ABD DISTENDED, TENDER. MEDICATED WITH MORPHINE X2 AND NORCO WITH LITTLE RELIEF. INTERMITTENT 
NAUSEA WITH EMESIS X6, ANTIEMETICS GIVEN. DIARRHEA X3. CDIFF SAMPLE ORDERED. FC REMOVED, DUE 
TO VOID. CT SCAN WITH CONTRAST ORDERED PER MD ORDERS. NEPHROLOGY CONSULTED FOR INCREASED 
CREATININE. VSS. AFEBRILE. ALL NEEDS MET, SAFETY AND COMFORT MEASURES MAINTAINED, CALL LIGHT 
WITHIN REACH.

## 2023-03-23 NOTE — NUR
PT AGAIN REFUSING SCHEDULED TREATMENT DUE TO ABDOMINAL PAIN, PT BREATH SOUNDS : SLIGHT 
EXPIRATORY WHEEZE, CLEAR IN THE APICES, WITH COARSE CRACKLES IN THE BASES. RECOMMENDED TO 
THE PT THE ADMINISTRATION OF THE TREATMENT WOULD HELP, HOWEVER PATIENT STILL REFUSED, RN 
PRESENT BEDSIDE. WILL CONTINUE TO MONITOR

## 2023-03-23 NOTE — NUR
PER PT GIVE ME MEDICINE THAT MAKES  ME RELAX  , BIT NERVOUS SHE KEEPS TOUCHING HER CALL 
[facility] SHE DOES NOT PRESS THE THE RED BUTTON  - / 63 ,  - WILL 
MEDICATE .

## 2023-03-23 NOTE — NUR
PT REFUSING BREATHING TX AT THIS TIME, STATING HER STOMACH IS BOTHERING HER. PATIENT IS 
AWARE OF AS NEEDED TREATMENT. PT IS IN NO RESPIRATORY DISTRESS AT THIS TIME SATURATION 97% 
ON 2L N/C. RN AWARE OF PATIENTS COMPLAINT OF STOMACH DISCOMFORT, WILL CONTINUE TO MONITOR

## 2023-03-23 NOTE — NUR
PER PT . SHE IS SO TIRED AND FEELING NO ENOUGH CARE - WILL REFER TO RT FOR FURTHER 
ASSESSMENT . CALL LIGHT WITHIN REACH  - FOR CLOSELY MONITOR .

-------------------------------------------------------------------------------

Addendum: 03/23/23 at 2344 by Keri Gill RN

-------------------------------------------------------------------------------

AT 2318 O2 SAT 98 %

## 2023-03-23 NOTE — NUR
LATEST  CXR RESULT SENT BOTH QUYEN AND DR Calvin CEJA -  PER DR. NAPIER DECREASE IVF TO 75 /CC - 
WILL CARRY OUT . 

-------------------------------------------------------------------------------

Addendum: 03/23/23 at 2247 by Keri Gill RN

-------------------------------------------------------------------------------

AT 2235 , AUSCULTATE THE LUNGS SOUNDS , THERE IS CRACKLES ON THE LEFT BASE UPPER PART OF THE 
LUNGS ,  INFORMED ALREADY DR. CEJA ABOUT IT . 

-------------------------------------------------------------------------------

Addendum: 03/24/23 at 0229 by Keri Gill RN

-------------------------------------------------------------------------------

PER DR. NAPIER   CARRY OUT IVF BUT  75 CC/ INSTEAD  /CC - CHARGE NURSE  AWARE  .

## 2023-03-23 NOTE — NUR
RECEIVED PT NOT ON RESTRAINT PER AM NURSE  PT IS NOT ON RESTRAINT BEC. PT IS AAOX4 , FOLLOW 
THE COMMAND AND RESTING CALM ON BED , WILL CONT. TO MONITOR . BEDALARM ON , CALL LIGHT 
WITHIN REACH .

## 2023-03-24 VITALS — DIASTOLIC BLOOD PRESSURE: 48 MMHG | SYSTOLIC BLOOD PRESSURE: 96 MMHG

## 2023-03-24 VITALS — SYSTOLIC BLOOD PRESSURE: 91 MMHG | DIASTOLIC BLOOD PRESSURE: 62 MMHG

## 2023-03-24 VITALS — SYSTOLIC BLOOD PRESSURE: 153 MMHG | DIASTOLIC BLOOD PRESSURE: 58 MMHG

## 2023-03-24 VITALS — DIASTOLIC BLOOD PRESSURE: 78 MMHG | SYSTOLIC BLOOD PRESSURE: 124 MMHG

## 2023-03-24 VITALS — SYSTOLIC BLOOD PRESSURE: 121 MMHG | DIASTOLIC BLOOD PRESSURE: 82 MMHG

## 2023-03-24 VITALS — SYSTOLIC BLOOD PRESSURE: 100 MMHG | DIASTOLIC BLOOD PRESSURE: 70 MMHG

## 2023-03-24 VITALS — SYSTOLIC BLOOD PRESSURE: 103 MMHG | DIASTOLIC BLOOD PRESSURE: 74 MMHG

## 2023-03-24 VITALS — SYSTOLIC BLOOD PRESSURE: 97 MMHG | DIASTOLIC BLOOD PRESSURE: 61 MMHG

## 2023-03-24 VITALS — SYSTOLIC BLOOD PRESSURE: 164 MMHG | DIASTOLIC BLOOD PRESSURE: 65 MMHG

## 2023-03-24 VITALS — DIASTOLIC BLOOD PRESSURE: 27 MMHG | SYSTOLIC BLOOD PRESSURE: 48 MMHG

## 2023-03-24 VITALS — DIASTOLIC BLOOD PRESSURE: 65 MMHG | SYSTOLIC BLOOD PRESSURE: 94 MMHG

## 2023-03-24 VITALS — DIASTOLIC BLOOD PRESSURE: 64 MMHG | SYSTOLIC BLOOD PRESSURE: 92 MMHG

## 2023-03-24 VITALS — SYSTOLIC BLOOD PRESSURE: 48 MMHG | DIASTOLIC BLOOD PRESSURE: 27 MMHG

## 2023-03-24 VITALS — DIASTOLIC BLOOD PRESSURE: 63 MMHG | SYSTOLIC BLOOD PRESSURE: 104 MMHG

## 2023-03-24 VITALS — SYSTOLIC BLOOD PRESSURE: 98 MMHG | DIASTOLIC BLOOD PRESSURE: 59 MMHG

## 2023-03-24 VITALS — SYSTOLIC BLOOD PRESSURE: 90 MMHG | DIASTOLIC BLOOD PRESSURE: 50 MMHG

## 2023-03-24 VITALS — SYSTOLIC BLOOD PRESSURE: 90 MMHG | DIASTOLIC BLOOD PRESSURE: 30 MMHG

## 2023-03-24 VITALS — SYSTOLIC BLOOD PRESSURE: 90 MMHG | DIASTOLIC BLOOD PRESSURE: 54 MMHG

## 2023-03-24 VITALS — SYSTOLIC BLOOD PRESSURE: 121 MMHG | DIASTOLIC BLOOD PRESSURE: 70 MMHG

## 2023-03-24 VITALS — DIASTOLIC BLOOD PRESSURE: 40 MMHG | SYSTOLIC BLOOD PRESSURE: 114 MMHG

## 2023-03-24 VITALS — DIASTOLIC BLOOD PRESSURE: 60 MMHG | SYSTOLIC BLOOD PRESSURE: 88 MMHG

## 2023-03-24 VITALS — DIASTOLIC BLOOD PRESSURE: 50 MMHG | SYSTOLIC BLOOD PRESSURE: 90 MMHG

## 2023-03-24 VITALS — DIASTOLIC BLOOD PRESSURE: 40 MMHG | SYSTOLIC BLOOD PRESSURE: 97 MMHG

## 2023-03-24 VITALS — SYSTOLIC BLOOD PRESSURE: 99 MMHG | DIASTOLIC BLOOD PRESSURE: 64 MMHG

## 2023-03-24 VITALS — SYSTOLIC BLOOD PRESSURE: 92 MMHG | DIASTOLIC BLOOD PRESSURE: 60 MMHG

## 2023-03-24 VITALS — SYSTOLIC BLOOD PRESSURE: 137 MMHG | DIASTOLIC BLOOD PRESSURE: 91 MMHG

## 2023-03-24 VITALS — SYSTOLIC BLOOD PRESSURE: 99 MMHG | DIASTOLIC BLOOD PRESSURE: 70 MMHG

## 2023-03-24 VITALS — DIASTOLIC BLOOD PRESSURE: 70 MMHG | SYSTOLIC BLOOD PRESSURE: 110 MMHG

## 2023-03-24 VITALS — SYSTOLIC BLOOD PRESSURE: 125 MMHG | DIASTOLIC BLOOD PRESSURE: 85 MMHG

## 2023-03-24 VITALS — SYSTOLIC BLOOD PRESSURE: 88 MMHG | DIASTOLIC BLOOD PRESSURE: 71 MMHG

## 2023-03-24 VITALS — SYSTOLIC BLOOD PRESSURE: 147 MMHG | DIASTOLIC BLOOD PRESSURE: 69 MMHG

## 2023-03-24 VITALS — DIASTOLIC BLOOD PRESSURE: 23 MMHG | SYSTOLIC BLOOD PRESSURE: 107 MMHG

## 2023-03-24 VITALS — SYSTOLIC BLOOD PRESSURE: 123 MMHG | DIASTOLIC BLOOD PRESSURE: 66 MMHG

## 2023-03-24 VITALS — SYSTOLIC BLOOD PRESSURE: 85 MMHG | DIASTOLIC BLOOD PRESSURE: 61 MMHG

## 2023-03-24 VITALS — SYSTOLIC BLOOD PRESSURE: 110 MMHG | DIASTOLIC BLOOD PRESSURE: 63 MMHG

## 2023-03-24 VITALS — DIASTOLIC BLOOD PRESSURE: 60 MMHG | SYSTOLIC BLOOD PRESSURE: 96 MMHG

## 2023-03-24 VITALS — SYSTOLIC BLOOD PRESSURE: 113 MMHG | DIASTOLIC BLOOD PRESSURE: 65 MMHG

## 2023-03-24 LAB
ANION GAP SERPL CALCULATED.3IONS-SCNC: 18.1 MMOL/L (ref 8–16)
BASOPHILS # BLD AUTO: 0 K/UL (ref 0–0.22)
BASOPHILS NFR BLD AUTO: 0.1 % (ref 0–2)
BUN SERPL-MCNC: 61 MG/DL (ref 7–18)
CHLORIDE SERPL-SCNC: 95 MMOL/L (ref 98–107)
CO2 SERPL-SCNC: 25.5 MMOL/L (ref 21–32)
CREAT SERPL-MCNC: 3 MG/DL (ref 0.6–1.3)
EOSINOPHIL # BLD AUTO: 0 K/UL (ref 0–0.4)
EOSINOPHIL NFR BLD AUTO: 0.1 % (ref 0–4)
ERYTHROCYTE [DISTWIDTH] IN BLOOD BY AUTOMATED COUNT: 15.1 % (ref 11.6–13.7)
GFR SERPL CREATININE-BSD FRML MDRD: 21 ML/MIN (ref 90–?)
GLUCOSE SERPL-MCNC: 136 MG/DL (ref 74–106)
HCT VFR BLD AUTO: 44.7 % (ref 36–48)
HGB BLD-MCNC: 15 G/DL (ref 12–16)
LYMPHOCYTES # BLD AUTO: 0.7 K/UL (ref 2.5–16.5)
LYMPHOCYTES NFR BLD AUTO: 3.5 % (ref 20.5–51.1)
MAGNESIUM SERPL-MCNC: 1.5 MG/DL (ref 1.8–2.4)
MCH RBC QN AUTO: 33 PG (ref 27–31)
MCHC RBC AUTO-ENTMCNC: 34 G/DL (ref 33–37)
MCV RBC AUTO: 98.9 FL (ref 80–94)
MONOCYTES # BLD AUTO: 1 K/UL (ref 0.8–1)
MONOCYTES NFR BLD AUTO: 4.8 % (ref 1.7–9.3)
NEUTROPHILS # BLD AUTO: 19.1 K/UL (ref 1.8–7.7)
NEUTROPHILS NFR BLD AUTO: 91.5 % (ref 42.2–75.2)
PHOSPHATE SERPL-MCNC: 7.8 MG/DL (ref 2.5–4.9)
PLATELET # BLD AUTO: 208 K/UL (ref 140–450)
POTASSIUM SERPL-SCNC: 4.6 MMOL/L (ref 3.5–5.1)
RBC # BLD AUTO: 4.52 MIL/UL (ref 4.2–5.4)
SODIUM SERPL-SCNC: 134 MMOL/L (ref 136–145)
WBC # BLD AUTO: 20.9 K/UL (ref 4.8–10.8)

## 2023-03-24 PROCEDURE — 0BH17EZ INSERTION OF ENDOTRACHEAL AIRWAY INTO TRACHEA, VIA NATURAL OR ARTIFICIAL OPENING: ICD-10-PCS

## 2023-03-24 PROCEDURE — 5A12012 PERFORMANCE OF CARDIAC OUTPUT, SINGLE, MANUAL: ICD-10-PCS

## 2023-03-24 PROCEDURE — 5A1935Z RESPIRATORY VENTILATION, LESS THAN 24 CONSECUTIVE HOURS: ICD-10-PCS

## 2023-03-24 RX ADMIN — IPRATROPIUM BROMIDE AND ALBUTEROL SULFATE SCH ML: .5; 3 SOLUTION RESPIRATORY (INHALATION) at 07:00

## 2023-03-24 RX ADMIN — IPRATROPIUM BROMIDE AND ALBUTEROL SULFATE SCH ML: .5; 3 SOLUTION RESPIRATORY (INHALATION) at 11:10

## 2023-03-24 RX ADMIN — NOREPINEPHRINE BITARTRATE PRN MLS/HR: 1 INJECTION INTRAVENOUS at 14:12

## 2023-03-24 RX ADMIN — GUAIFENESIN SCH MG: 600 TABLET, EXTENDED RELEASE ORAL at 21:00

## 2023-03-24 RX ADMIN — IPRATROPIUM BROMIDE AND ALBUTEROL SULFATE SCH ML: .5; 3 SOLUTION RESPIRATORY (INHALATION) at 19:26

## 2023-03-24 RX ADMIN — SODIUM CHLORIDE SCH MLS/HR: 9 INJECTION, SOLUTION INTRAVENOUS at 21:05

## 2023-03-24 RX ADMIN — IPRATROPIUM BROMIDE AND ALBUTEROL SULFATE SCH ML: .5; 3 SOLUTION RESPIRATORY (INHALATION) at 15:01

## 2023-03-24 RX ADMIN — SODIUM CHLORIDE SCH MLS/HR: 9 INJECTION, SOLUTION INTRAVENOUS at 09:19

## 2023-03-24 RX ADMIN — MORPHINE SULFATE PRN MG: 2 INJECTION, SOLUTION INTRAMUSCULAR; INTRAVENOUS at 13:19

## 2023-03-24 RX ADMIN — DEXTROSE AND SODIUM CHLORIDE SCH MLS/HR: 5; .9 INJECTION, SOLUTION INTRAVENOUS at 04:00

## 2023-03-24 RX ADMIN — NOREPINEPHRINE BITARTRATE PRN MLS/HR: 1 INJECTION INTRAVENOUS at 06:10

## 2023-03-24 RX ADMIN — IPRATROPIUM BROMIDE AND ALBUTEROL SULFATE SCH ML: .5; 3 SOLUTION RESPIRATORY (INHALATION) at 23:00

## 2023-03-24 RX ADMIN — WATER SCH MG: 1 INJECTION INTRAMUSCULAR; INTRAVENOUS; SUBCUTANEOUS at 09:20

## 2023-03-24 RX ADMIN — GUAIFENESIN SCH MG: 600 TABLET, EXTENDED RELEASE ORAL at 09:00

## 2023-03-24 RX ADMIN — MORPHINE SULFATE PRN MG: 2 INJECTION, SOLUTION INTRAMUSCULAR; INTRAVENOUS at 11:46

## 2023-03-24 NOTE — NUR
1843 ABG DRAWN AND RESULTS SENT TO DR NEELY AND DR NAPIER. DR NEELY ORDERED RR TO INCREASE 
TO 28 AND LOWERED FIO2 TO 50%. INLINE HHNTX GIVEN

## 2023-03-24 NOTE — NUR
Notified Dr CEJA about pt's condition as received in ICU order received to do ABG and call 
critical care MD on call

## 2023-03-24 NOTE — NUR
1104:  B/P allegedly dropped to 67/47, Levophed was increased to 7 mcg.  RR has increased to 
40 on biPAP @ 40% and HR incfreased to 138.

1145:  Rapid response called for continued increased HR and RR, patient was given Ativan 2 
mg IV per PMD phone request.  ER MD at bedside asked that Levophed be increased to max 25 
mcg.  ABG obtained.  Changing pulse oxymeter for better waveform.

1200:  Holding on intubation per ER MD, pO2 by ABG is 367.8.  FIO2 on biPAP decreased from 
100% back to 40%.  

1230:  Dr. Ryan contacted and ordered change in IV fluids to D5W with bicarb.  Dr. Johnson 
updated and requested Connor be contacted for orders.

Patient appears more comfortable.  

1240:  biPAP DC'd and patient started on HFNC 40% FIO2 at 40 l/min.

-------------------------------------------------------------------------------

Addendum: 03/24/23 at 1400 by Agency Nurse Luis A, RN RN

-------------------------------------------------------------------------------

1400:  Dr. Vee texted abd series report.  Patient currently off Levophed and precedex 
and B/P 114/40.

## 2023-03-24 NOTE — NUR
Dr Chavis was here GI consults updates on pt's on admission and now. MD saw pt at the 
bedside order received to insert rectal tube to decompress large distended abdomen. MD also 
discuss pt's condition with pt's  at the bedside

## 2023-03-24 NOTE — NUR
will inform family pt transferred to icu 

-------------------------------------------------------------------------------

Addendum: 03/24/23 at 0403 by Keri Gill RN

-------------------------------------------------------------------------------

INFORMED RELATIVE - PT IS IN ICU .

## 2023-03-24 NOTE — NUR
@2202 no pulse on the monitor nor palpable code blue called CPR started, ER TEAM with Dr MUNSON responded at the bedside after 2 amps of Epinephrine pt recovered with palpable pulses 
code called off.

## 2023-03-24 NOTE — NUR
Updates Dr Worthy on pt's condition as at this time vent settings a.c prvc, rate 28 FIO2 
50%  O2 SAT 96%  maxed out on all pressors Levophed, Vasopressin, Neosynephrine  
Epinephrie, no blood pressure HR rate 110 to 116 on the monitor and palplable also radial, 
pedal, and carotid very weak as at this time no new orders received MD said just continue to 
monitor.

## 2023-03-24 NOTE — NUR
Updates Dr Martine Cabrera about pt's condition  no blood presure unobtainable in all extremities 
also maxed out on all pressors Levophed Vasopressin Epinephrine Neosynephrine and MD said to 
contact intensivist

## 2023-03-24 NOTE — NUR
PT CODED AT 1700 AGAIN, ROSC WAS OBTAINED. ABG DONE PER SR MEZA. pH 6.89, pCO2 75.9, Po2 
84.9, HCO3- 14.2, BE -19.5. CALLED DR. NEELY WHO ASKED TO MAKE CHANGES TO VENT SETTINGS. 
TITRATE UP rr TO 22 (FROM 18), AND INCREASE TIDAL VOLUME . PT GIVEN BICARB AND IS NOW 
DOING BETTER. WILL CONTINUE TO MONITOR PT CAREFULLY. ANOTHER ABG ORDERED FOR 6:30.

## 2023-03-24 NOTE — NUR
2200 CODE BLUE CALLED. PT BEING BAGGED AND CPR PERFORMED.ACLS DRUGS GIVEN. ROSC WAS UPTAINED 
AND PATIENT PLACED BACK ON VENT SAME SETTINGS

## 2023-03-24 NOTE — NUR
INFORM / BRENNA , PT IS LABORED BREATHING , C/O SUDDENLY HEADACHE , AND WHEEZES AND 
CRACKLES BREATH SOUNDS , O2  DESATING , BP 84/44 , FACIAL PUFFINESS NOTED .

-------------------------------------------------------------------------------

Addendum: 03/24/23 at 0227 by Keri Gill RN

-------------------------------------------------------------------------------

TOO BLOATED ABDOMEN  - STAT CXR AND STAT KUB PER RT . - INFORMED DR. CEJA .

## 2023-03-24 NOTE — NUR
BP RE CHECK 133/ 99 , O2 SAT 96 % , RR 20 , WILL CONT. TO MONITOR .

-------------------------------------------------------------------------------

Addendum: 03/24/23 at 0252 by Keri Gill RN

-------------------------------------------------------------------------------

st fallon - done - deborah  w/ sap - inform charge nurse diet order is not updated .

## 2023-03-24 NOTE — NUR
3/24/23 RD FOLLOW UP COMPLETED



PLEASE REFER TO NUTRITION ASSESSMENT UNDER CARE ACTIVITY FOR ESTIMATED NUTRITIONAL NEEDS. 



1. CONTINUE CARDIAC DIET AS TOLERATED

2. MONITOR PO INTAKE, GI SYMPTOMS, AND NUTRITION RELATED LAB VALUES

3. RD TO FOLLOW-UP 2-3 DAYS, HIGH RISK



REVIEWED BY RINA BRADY RD

## 2023-03-24 NOTE — NUR
Change of shift report given to Flavia ACEVEDO for continuity of care as at this time pt 
tolerating all care and treatments fairly.

## 2023-03-24 NOTE — NUR
@0310 Notified Dr Chino about the pt's condition on transfer to ICU labs value with 
elevated WBC 28.8 pt not on any antibiotics , BUN, Creatinie Potassium Low PCO2 on blood gas 
24.2 po2 498 order received, 1 liter NS iv BOLUS, 2 AMPS OF BICARB, Meropenem 500mg q6, 
ivpb, and to decrease Fio2 to 50% all done pt tolerated well no adverse reaction noted 
vitals signs stable

## 2023-03-24 NOTE — NUR
PHYSICAL THERAPY NOTE: RN DID NOT GIVE CLEARANCE FOR PHYSICAL THERAPY TREATMENT TODAY DUE TO 
PATIENT'S RESPIRATORY DISTRESS DURING MOBILITY. PATIENT IS CURRENTLY ON HI FLOW 35L AT 35%, 
O2 SAT 93-95%. WILL FOLLOW UP NEXT VISIT.

## 2023-03-24 NOTE — NUR
CALLED TO BEDSIDE FOR DESATURATION. CHANGED OUT PULSE OX. ELEVATED RESPIRATORY RATE IN THER 
40S. PT STATED SHE WAS IN PAIN. OUT OF CONCERN FOR THE PATIENT WAS HAD THE ER DOC TAKE A 
LOOK AT HER. NO INTUBATION AT THE TIME. ABG RAN AND GIVEN TO DR BOWLES. 

DR. NEELY RECOMMENDS HIGHFLOW AT THIS TIME. WILL CONTINUE TO MONITOR.

## 2023-03-24 NOTE — NUR
ON OR ABOUT THIS TIME CODE BLUE CALLED PARDEEP MCDUFFIE RCP AND HELIO LIU RCP ATTENDING

-------------------------------------------------------------------------------

Addendum: 03/24/23 at 1836 by Trace Mcduffie RT

-------------------------------------------------------------------------------

CPR

## 2023-03-24 NOTE — NUR
Received report from Keri ACEVEDO the nurse taking care of pt in [facility] prior to transfer to the 
unit .

## 2023-03-24 NOTE — NUR
at labored breathing more audible wheezes and crackles , abd  more bigger in appearance ,  
desating , ocassionally 91 % but mostly down to 80'2 and 70's % , facial puffiness noted , 
stat refer to rt .

-------------------------------------------------------------------------------

Addendum: 03/24/23 at 0304 by Keri Gill RN

-------------------------------------------------------------------------------

  at 0130  pt is on Syntervention  with very small u.o  . At this time we re encourage pt for  
beltran  cath  , at this time pt . agree for  beltran cath  . 

-------------------------------------------------------------------------------

Addendum: 03/24/23 at 0306 by Keri Gill RN

-------------------------------------------------------------------------------

THE WORD AT IN THE ABOVE NURSE'S NOTE IS AN ERROR ENTRY  - ANKIT

## 2023-03-24 NOTE — NUR
PATIENT CODED  1500 AND WAS INTUBATED. POSSIBLE ASPIRATION OF GASTRIC JUICES AND/OR BLOOD. 
POST INTUBATION SUCTION THROUGH ET TUBE AND REMOVED COPIOUS AMOUNT OF BROWN/BLOODY 
SECRETION. LARGE AMOUNT OF BLOOD SUCTIONED FROM MOUTH. PT STABLE BUT WILL CONTINUE TO 
MONITOR.

## 2023-03-24 NOTE — NUR
USING A GLIDESCOPE PATIENT SUCCESSFULLY INTUBATED BY DR. SONNY BAIRES WITH AN 
ENDOTRACHEAL TUBE #7.5 SECURED AT 22cm WITH AN ANCHOR FAST CUFF PRESSURE INFLATED WITH 8CC 
OF AIR VIA A 10CC SYRINGE PLACEMENT CONFIRMED VIA ETCO2 WITH COLOR CHANGE - YELLOW GOOD 
CHEST RISE BILATERAL AUSCULTATION JAQUI BAIRES CXT TO FOLLOW

-------------------------------------------------------------------------------

Addendum: 03/24/23 at 1845 by Trace Downs RT

-------------------------------------------------------------------------------

ACTUAL TIME 7985

## 2023-03-24 NOTE — NUR
PT  arrived at the bedside support also education on pt's condition as at this time 
pt still maxed IV pressors very critical explained to him at the bedside so he verbalized 
understanding and he signed DNR code status so no more resuscitation efforts

## 2023-03-24 NOTE — NUR
SPO2 98%. TITRATED FROM 60% TO 30%. SPO2 96%. PT TOLERATING WELL AT THIS TIME. WILL CONTINUE 
TO MONITOR PT.

## 2023-03-24 NOTE — NUR
pt  notified about the code blue again also encouraged him and to call other family 
to  come and see pt at the bedside so the pt is not alone.Pt's  said pt does not have 
any family only him and her son are close family

## 2023-03-24 NOTE — NUR
TITRATED FiO2 FROM 100% TO 60% AFTER ABG RESULTS. SPO2 96%. WILL CONTINUE TO MONITOR PT AND 
TITRATE FiO2.

## 2023-03-24 NOTE — NUR
@1928 Report received from Anne ACEVEDO at bedside, pt coded during day shift twice as at this 
time pt in unresponsive eyes dilated no reaction to light size6/6 bilateral no movement in 
all extremities to pain and tactile stimulus. Oxygenation via ventilator a/c prvc rate 28 
FIO2 50%  PEEP 14 O2 sat 97% blood pressure unobtainable in all extremities no urine 
via the beltran family aware of pt's critical condition and they requested if there is any 
code again after that there should be no more resuscitation effort after the third one pt 
should be made DNR and Dr Chino is aware. Maxed out on all pressors levophed @ 30mcg/min 
Vasopressin @0.03units/min Neosynephrine @150mcg/min,  Epinhephrine @101mcg/min

## 2023-03-24 NOTE — NUR
ROSC ACHIEVED PLACED BACK ON VENTILATOR SUPPORT; REVIEWED BLOOD PRESSURE 125/85; INCREASE 
PEEP TO [id number] TO MAINTAIN SATURATION GREATER THAN 92%

## 2023-03-24 NOTE — NUR
@0215 Received pt on transfer from [facility] due to shortness of breath labor breathing tachypneic 
tachycardia restless/anxious, cool and clammy skin. Bleed sugar checked was 127 and oral 
temp was 97.7 denies pain. Placed on Bipap by R. Pt able to follow commands moves all 
extremities aware she is in the hospital, education and reorientation to the unit will 
continue to support and treat as per care plan.

## 2023-03-24 NOTE — NUR
BREATHING TREATMENT NOT GIVEN DUE TO PATIENT DISTRESS. PT IS RESTING COMFORTABLY AT THIS 
TIME. WILL CONTINUE TO MONITOR.

## 2023-03-24 NOTE — NUR
RECEIVED PT ON ANKUSH V60 SETTING 16\8. F16, 30%. SIZE SMALL MASK. SATURATION WAS FROM FROM 
85% TO 90%. TITRATED FIO2 TO 40%. SATURATION 92%.RALES AT THE BASES, DIMINISHED UPPER WITH A 
FAINT EXPIRATORY WHEEZE. BREATHING TREATMENT GIVEN. IMPROVED AERATION. PT TRIES TO TAKE 
BIPAP OFF. INSTRUCTED HER ON THE IMPORTANCE OF KEEPING IT ON. WILL CONTINUE TO MONITOR.

## 2023-03-25 NOTE — NUR
One legacy representative called back and said pt is not suitable for donation so can be 
released to family and Mortuary

## 2023-03-25 NOTE — NUR
Post Mortem care done and p's body  moved to [facility] pending famiy response this morning 3/25

## 2023-03-25 NOTE — NUR
's office notified spoke to Kegleyeli Magdaleno and body to be released to family 
or mortuary regarded as not a 's case.

## 2025-07-17 NOTE — NUR
3/17/23 RD FOLLOW UP COMPLETED



PLEASE REFER TO NUTRITION ASSESSMENT UNDER CARE ACTIVITY FOR ESTIMATED NUTRITIONAL NEEDS. 



1. CONTINUE NPO

2. RECOMMEND ST EVAL

-IF PT PASSES ST EVAL, RECOMMEND CARDIAC DIET, WITH MODIFIED TEXTURE, PER ST

-IF PT FAILS ST EVAL, WHEN/ IF MEDICALLY APPROPRIATE, AND IF PT GETS GTUBE, RECOMMEND VITAL 
AF 1.2 KATHARINA RATE TO 45 ML/HR,  ML Q6H TO MEET NUTRITIONAL NEEDS, WILL PROVIDE 1080 ML 
VOLUME, 1296 KCALS, 81 G PROTEIN, AND 1275 ML FREE WATER, MEETING 100% ESTIMATED CALORIE/ 
PROTEIN NEEDS, ADEQUATE

3. MONITOR LAB VALUES.

4. RD TO FOLLOW-UP 2-3 DAYS, HIGH RISK



REVIEWED BY RINA BRADY RD Last Visit Date: 4/23/2025   Next Visit Date: 8/1/2025